# Patient Record
Sex: MALE | Race: BLACK OR AFRICAN AMERICAN | NOT HISPANIC OR LATINO | Employment: FULL TIME | URBAN - METROPOLITAN AREA
[De-identification: names, ages, dates, MRNs, and addresses within clinical notes are randomized per-mention and may not be internally consistent; named-entity substitution may affect disease eponyms.]

---

## 2019-12-27 ENCOUNTER — OCCUPATIONAL HEALTH (OUTPATIENT)
Dept: URGENT CARE | Facility: CLINIC | Age: 54
End: 2019-12-27

## 2019-12-27 DIAGNOSIS — Z02.1 PRE-EMPLOYMENT EXAMINATION: Primary | ICD-10-CM

## 2019-12-27 LAB — BREATH ALCOHOL: 0

## 2019-12-27 PROCEDURE — 82075 ASSAY OF BREATH ETHANOL: CPT | Mod: S$GLB,,, | Performed by: NURSE PRACTITIONER

## 2019-12-27 PROCEDURE — 82075 POCT BREATH ALCOHOL TEST: ICD-10-PCS | Mod: S$GLB,,, | Performed by: NURSE PRACTITIONER

## 2022-07-13 ENCOUNTER — HOSPITAL ENCOUNTER (OUTPATIENT)
Facility: HOSPITAL | Age: 57
Discharge: HOME | End: 2022-07-14
Attending: EMERGENCY MEDICINE | Admitting: FAMILY MEDICINE
Payer: MEDICAID

## 2022-07-13 DIAGNOSIS — R06.02 SOB (SHORTNESS OF BREATH): ICD-10-CM

## 2022-07-13 DIAGNOSIS — M94.0 COSTOCHONDRITIS: Primary | ICD-10-CM

## 2022-07-13 DIAGNOSIS — R07.9 CHEST PAIN: ICD-10-CM

## 2022-07-13 LAB
ALBUMIN SERPL BCP-MCNC: 3.9 G/DL (ref 3.5–5.2)
ALP SERPL-CCNC: 65 U/L (ref 55–135)
ALT SERPL W/O P-5'-P-CCNC: 12 U/L (ref 10–44)
AMPHET+METHAMPHET UR QL: NEGATIVE
ANION GAP SERPL CALC-SCNC: 14 MMOL/L (ref 8–16)
AST SERPL-CCNC: 19 U/L (ref 10–40)
BARBITURATES UR QL SCN>200 NG/ML: NEGATIVE
BASOPHILS # BLD AUTO: 0.06 K/UL (ref 0–0.2)
BASOPHILS NFR BLD: 0.4 % (ref 0–1.9)
BENZODIAZ UR QL SCN>200 NG/ML: NEGATIVE
BILIRUB SERPL-MCNC: 0.4 MG/DL (ref 0.1–1)
BNP SERPL-MCNC: 18 PG/ML (ref 0–99)
BUN SERPL-MCNC: 5 MG/DL (ref 6–20)
BZE UR QL SCN: ABNORMAL
CALCIUM SERPL-MCNC: 9.6 MG/DL (ref 8.7–10.5)
CANNABINOIDS UR QL SCN: NEGATIVE
CHLORIDE SERPL-SCNC: 100 MMOL/L (ref 95–110)
CO2 SERPL-SCNC: 22 MMOL/L (ref 23–29)
CREAT SERPL-MCNC: 0.7 MG/DL (ref 0.5–1.4)
CREAT UR-MCNC: 44 MG/DL (ref 23–375)
CTP QC/QA: YES
D DIMER PPP IA.FEU-MCNC: 0.45 MG/L FEU
D DIMER PPP IA.FEU-MCNC: 0.46 MG/L FEU
DIFFERENTIAL METHOD: ABNORMAL
EOSINOPHIL # BLD AUTO: 0 K/UL (ref 0–0.5)
EOSINOPHIL NFR BLD: 0 % (ref 0–8)
ERYTHROCYTE [DISTWIDTH] IN BLOOD BY AUTOMATED COUNT: 12.5 % (ref 11.5–14.5)
EST. GFR  (AFRICAN AMERICAN): >60 ML/MIN/1.73 M^2
EST. GFR  (NON AFRICAN AMERICAN): >60 ML/MIN/1.73 M^2
ESTIMATED AVG GLUCOSE: 120 MG/DL (ref 68–131)
GLUCOSE SERPL-MCNC: 112 MG/DL (ref 70–110)
HBA1C MFR BLD: 5.8 % (ref 4–5.6)
HCT VFR BLD AUTO: 40.6 % (ref 40–54)
HGB BLD-MCNC: 14.4 G/DL (ref 14–18)
IMM GRANULOCYTES # BLD AUTO: 0.05 K/UL (ref 0–0.04)
IMM GRANULOCYTES NFR BLD AUTO: 0.3 % (ref 0–0.5)
LACTATE SERPL-SCNC: 1.8 MMOL/L (ref 0.5–2.2)
LIPASE SERPL-CCNC: 9 U/L (ref 4–60)
LYMPHOCYTES # BLD AUTO: 1.3 K/UL (ref 1–4.8)
LYMPHOCYTES NFR BLD: 8.4 % (ref 18–48)
MCH RBC QN AUTO: 32.6 PG (ref 27–31)
MCHC RBC AUTO-ENTMCNC: 35.5 G/DL (ref 32–36)
MCV RBC AUTO: 92 FL (ref 82–98)
METHADONE UR QL SCN>300 NG/ML: NEGATIVE
MONOCYTES # BLD AUTO: 1.3 K/UL (ref 0.3–1)
MONOCYTES NFR BLD: 9 % (ref 4–15)
NEUTROPHILS # BLD AUTO: 12.1 K/UL (ref 1.8–7.7)
NEUTROPHILS NFR BLD: 81.9 % (ref 38–73)
NRBC BLD-RTO: 0 /100 WBC
OPIATES UR QL SCN: ABNORMAL
PCP UR QL SCN>25 NG/ML: NEGATIVE
PLATELET # BLD AUTO: 290 K/UL (ref 150–450)
PMV BLD AUTO: 8.9 FL (ref 9.2–12.9)
POTASSIUM SERPL-SCNC: 3.8 MMOL/L (ref 3.5–5.1)
PROCALCITONIN SERPL IA-MCNC: 0.08 NG/ML
PROT SERPL-MCNC: 8.5 G/DL (ref 6–8.4)
RBC # BLD AUTO: 4.42 M/UL (ref 4.6–6.2)
SARS-COV-2 RDRP RESP QL NAA+PROBE: NEGATIVE
SODIUM SERPL-SCNC: 136 MMOL/L (ref 136–145)
TOXICOLOGY INFORMATION: ABNORMAL
TROPONIN I SERPL DL<=0.01 NG/ML-MCNC: 0.01 NG/ML (ref 0–0.03)
TROPONIN I SERPL DL<=0.01 NG/ML-MCNC: 0.01 NG/ML (ref 0–0.03)
TROPONIN I SERPL DL<=0.01 NG/ML-MCNC: <0.006 NG/ML (ref 0–0.03)
TROPONIN I SERPL DL<=0.01 NG/ML-MCNC: <0.006 NG/ML (ref 0–0.03)
TSH SERPL DL<=0.005 MIU/L-ACNC: 0.7 UIU/ML (ref 0.4–4)
WBC # BLD AUTO: 14.83 K/UL (ref 3.9–12.7)

## 2022-07-13 PROCEDURE — 27000221 HC OXYGEN, UP TO 24 HOURS

## 2022-07-13 PROCEDURE — A4216 STERILE WATER/SALINE, 10 ML: HCPCS | Performed by: NURSE PRACTITIONER

## 2022-07-13 PROCEDURE — 93005 ELECTROCARDIOGRAM TRACING: CPT

## 2022-07-13 PROCEDURE — 96375 TX/PRO/DX INJ NEW DRUG ADDON: CPT

## 2022-07-13 PROCEDURE — 25000003 PHARM REV CODE 250: Performed by: NURSE PRACTITIONER

## 2022-07-13 PROCEDURE — 94761 N-INVAS EAR/PLS OXIMETRY MLT: CPT

## 2022-07-13 PROCEDURE — 25000242 PHARM REV CODE 250 ALT 637 W/ HCPCS: Performed by: NURSE PRACTITIONER

## 2022-07-13 PROCEDURE — 80053 COMPREHEN METABOLIC PANEL: CPT | Performed by: NURSE PRACTITIONER

## 2022-07-13 PROCEDURE — 84443 ASSAY THYROID STIM HORMONE: CPT | Performed by: NURSE PRACTITIONER

## 2022-07-13 PROCEDURE — 99900035 HC TECH TIME PER 15 MIN (STAT)

## 2022-07-13 PROCEDURE — 93010 EKG 12-LEAD: ICD-10-PCS | Mod: ,,, | Performed by: INTERNAL MEDICINE

## 2022-07-13 PROCEDURE — 93010 ELECTROCARDIOGRAM REPORT: CPT | Mod: ,,, | Performed by: INTERNAL MEDICINE

## 2022-07-13 PROCEDURE — 83690 ASSAY OF LIPASE: CPT | Performed by: NURSE PRACTITIONER

## 2022-07-13 PROCEDURE — 63600175 PHARM REV CODE 636 W HCPCS: Performed by: NURSE PRACTITIONER

## 2022-07-13 PROCEDURE — 87040 BLOOD CULTURE FOR BACTERIA: CPT | Mod: 59 | Performed by: NURSE PRACTITIONER

## 2022-07-13 PROCEDURE — 93010 ELECTROCARDIOGRAM REPORT: CPT | Mod: 59,76,, | Performed by: INTERNAL MEDICINE

## 2022-07-13 PROCEDURE — U0002 COVID-19 LAB TEST NON-CDC: HCPCS | Performed by: NURSE PRACTITIONER

## 2022-07-13 PROCEDURE — 83880 ASSAY OF NATRIURETIC PEPTIDE: CPT | Performed by: NURSE PRACTITIONER

## 2022-07-13 PROCEDURE — 84484 ASSAY OF TROPONIN QUANT: CPT | Mod: 91 | Performed by: NURSE PRACTITIONER

## 2022-07-13 PROCEDURE — 96376 TX/PRO/DX INJ SAME DRUG ADON: CPT

## 2022-07-13 PROCEDURE — 99285 EMERGENCY DEPT VISIT HI MDM: CPT | Mod: 25

## 2022-07-13 PROCEDURE — G0378 HOSPITAL OBSERVATION PER HR: HCPCS

## 2022-07-13 PROCEDURE — 84145 PROCALCITONIN (PCT): CPT | Performed by: NURSE PRACTITIONER

## 2022-07-13 PROCEDURE — 99203 PR OFFICE/OUTPT VISIT, NEW, LEVL III, 30-44 MIN: ICD-10-PCS | Mod: ,,, | Performed by: INTERNAL MEDICINE

## 2022-07-13 PROCEDURE — 83036 HEMOGLOBIN GLYCOSYLATED A1C: CPT | Performed by: NURSE PRACTITIONER

## 2022-07-13 PROCEDURE — 99203 OFFICE O/P NEW LOW 30 MIN: CPT | Mod: ,,, | Performed by: INTERNAL MEDICINE

## 2022-07-13 PROCEDURE — 83605 ASSAY OF LACTIC ACID: CPT | Performed by: NURSE PRACTITIONER

## 2022-07-13 PROCEDURE — 96374 THER/PROPH/DIAG INJ IV PUSH: CPT

## 2022-07-13 PROCEDURE — 85379 FIBRIN DEGRADATION QUANT: CPT | Mod: 91 | Performed by: NURSE PRACTITIONER

## 2022-07-13 PROCEDURE — 85379 FIBRIN DEGRADATION QUANT: CPT | Performed by: NURSE PRACTITIONER

## 2022-07-13 PROCEDURE — 85025 COMPLETE CBC W/AUTO DIFF WBC: CPT | Performed by: NURSE PRACTITIONER

## 2022-07-13 PROCEDURE — 80307 DRUG TEST PRSMV CHEM ANLYZR: CPT | Performed by: FAMILY MEDICINE

## 2022-07-13 RX ORDER — AMOXICILLIN 250 MG
1 CAPSULE ORAL 2 TIMES DAILY
Status: DISCONTINUED | OUTPATIENT
Start: 2022-07-13 | End: 2022-07-14 | Stop reason: HOSPADM

## 2022-07-13 RX ORDER — ONDANSETRON 2 MG/ML
4 INJECTION INTRAMUSCULAR; INTRAVENOUS
Status: COMPLETED | OUTPATIENT
Start: 2022-07-13 | End: 2022-07-13

## 2022-07-13 RX ORDER — HYDRALAZINE HYDROCHLORIDE 20 MG/ML
10 INJECTION INTRAMUSCULAR; INTRAVENOUS EVERY 8 HOURS PRN
Status: DISCONTINUED | OUTPATIENT
Start: 2022-07-13 | End: 2022-07-14 | Stop reason: HOSPADM

## 2022-07-13 RX ORDER — SIMETHICONE 80 MG
1 TABLET,CHEWABLE ORAL 4 TIMES DAILY PRN
Status: DISCONTINUED | OUTPATIENT
Start: 2022-07-13 | End: 2022-07-14 | Stop reason: HOSPADM

## 2022-07-13 RX ORDER — KETOROLAC TROMETHAMINE 30 MG/ML
30 INJECTION, SOLUTION INTRAMUSCULAR; INTRAVENOUS ONCE
Status: COMPLETED | OUTPATIENT
Start: 2022-07-13 | End: 2022-07-13

## 2022-07-13 RX ORDER — ACETAMINOPHEN 325 MG/1
650 TABLET ORAL EVERY 4 HOURS PRN
Status: DISCONTINUED | OUTPATIENT
Start: 2022-07-13 | End: 2022-07-14 | Stop reason: HOSPADM

## 2022-07-13 RX ORDER — ONDANSETRON 8 MG/1
8 TABLET, ORALLY DISINTEGRATING ORAL EVERY 8 HOURS PRN
Status: DISCONTINUED | OUTPATIENT
Start: 2022-07-13 | End: 2022-07-14 | Stop reason: HOSPADM

## 2022-07-13 RX ORDER — NITROGLYCERIN 0.4 MG/1
0.4 TABLET SUBLINGUAL EVERY 5 MIN PRN
Status: COMPLETED | OUTPATIENT
Start: 2022-07-13 | End: 2022-07-13

## 2022-07-13 RX ORDER — TALC
6 POWDER (GRAM) TOPICAL NIGHTLY PRN
Status: DISCONTINUED | OUTPATIENT
Start: 2022-07-13 | End: 2022-07-14 | Stop reason: HOSPADM

## 2022-07-13 RX ORDER — MORPHINE SULFATE 4 MG/ML
4 INJECTION, SOLUTION INTRAMUSCULAR; INTRAVENOUS
Status: COMPLETED | OUTPATIENT
Start: 2022-07-13 | End: 2022-07-13

## 2022-07-13 RX ORDER — LIDOCAINE HYDROCHLORIDE 20 MG/ML
10 SOLUTION OROPHARYNGEAL ONCE
Status: COMPLETED | OUTPATIENT
Start: 2022-07-13 | End: 2022-07-13

## 2022-07-13 RX ORDER — IPRATROPIUM BROMIDE AND ALBUTEROL SULFATE 2.5; .5 MG/3ML; MG/3ML
3 SOLUTION RESPIRATORY (INHALATION) EVERY 4 HOURS PRN
Status: DISCONTINUED | OUTPATIENT
Start: 2022-07-13 | End: 2022-07-14 | Stop reason: HOSPADM

## 2022-07-13 RX ORDER — MAG HYDROX/ALUMINUM HYD/SIMETH 200-200-20
30 SUSPENSION, ORAL (FINAL DOSE FORM) ORAL ONCE
Status: COMPLETED | OUTPATIENT
Start: 2022-07-13 | End: 2022-07-13

## 2022-07-13 RX ORDER — MAG HYDROX/ALUMINUM HYD/SIMETH 200-200-20
30 SUSPENSION, ORAL (FINAL DOSE FORM) ORAL 4 TIMES DAILY PRN
Status: DISCONTINUED | OUTPATIENT
Start: 2022-07-13 | End: 2022-07-14 | Stop reason: HOSPADM

## 2022-07-13 RX ORDER — NITROGLYCERIN 0.4 MG/1
0.4 TABLET SUBLINGUAL EVERY 5 MIN PRN
Status: DISCONTINUED | OUTPATIENT
Start: 2022-07-13 | End: 2022-07-14 | Stop reason: HOSPADM

## 2022-07-13 RX ORDER — SODIUM CHLORIDE 0.9 % (FLUSH) 0.9 %
10 SYRINGE (ML) INJECTION EVERY 8 HOURS
Status: DISCONTINUED | OUTPATIENT
Start: 2022-07-13 | End: 2022-07-14 | Stop reason: HOSPADM

## 2022-07-13 RX ORDER — MORPHINE SULFATE 4 MG/ML
3 INJECTION, SOLUTION INTRAMUSCULAR; INTRAVENOUS ONCE
Status: COMPLETED | OUTPATIENT
Start: 2022-07-13 | End: 2022-07-13

## 2022-07-13 RX ORDER — MORPHINE SULFATE 2 MG/ML
2 INJECTION, SOLUTION INTRAMUSCULAR; INTRAVENOUS EVERY 4 HOURS PRN
Status: DISCONTINUED | OUTPATIENT
Start: 2022-07-13 | End: 2022-07-14 | Stop reason: HOSPADM

## 2022-07-13 RX ORDER — ONDANSETRON 2 MG/ML
4 INJECTION INTRAMUSCULAR; INTRAVENOUS EVERY 8 HOURS PRN
Status: DISCONTINUED | OUTPATIENT
Start: 2022-07-13 | End: 2022-07-14 | Stop reason: HOSPADM

## 2022-07-13 RX ADMIN — Medication 6 MG: at 10:07

## 2022-07-13 RX ADMIN — NITROGLYCERIN 0.4 MG: 0.4 TABLET, ORALLY DISINTEGRATING SUBLINGUAL at 11:07

## 2022-07-13 RX ADMIN — Medication 10 ML: at 10:07

## 2022-07-13 RX ADMIN — KETOROLAC TROMETHAMINE 30 MG: 30 INJECTION, SOLUTION INTRAMUSCULAR at 10:07

## 2022-07-13 RX ADMIN — NITROGLYCERIN 0.4 MG: 0.4 TABLET, ORALLY DISINTEGRATING SUBLINGUAL at 10:07

## 2022-07-13 RX ADMIN — ALUMINUM HYDROXIDE, MAGNESIUM HYDROXIDE, AND SIMETHICONE 30 ML: 200; 200; 20 SUSPENSION ORAL at 01:07

## 2022-07-13 RX ADMIN — DOCUSATE SODIUM AND SENNOSIDES 1 TABLET: 8.6; 5 TABLET, FILM COATED ORAL at 10:07

## 2022-07-13 RX ADMIN — LIDOCAINE HYDROCHLORIDE 10 ML: 20 SOLUTION ORAL; TOPICAL at 01:07

## 2022-07-13 RX ADMIN — MORPHINE SULFATE 2 MG: 2 INJECTION, SOLUTION INTRAMUSCULAR; INTRAVENOUS at 08:07

## 2022-07-13 RX ADMIN — MORPHINE SULFATE 4 MG: 4 INJECTION INTRAVENOUS at 11:07

## 2022-07-13 RX ADMIN — ONDANSETRON HYDROCHLORIDE 4 MG: 2 INJECTION, SOLUTION INTRAMUSCULAR; INTRAVENOUS at 11:07

## 2022-07-13 RX ADMIN — MORPHINE SULFATE 3 MG: 4 INJECTION INTRAVENOUS at 02:07

## 2022-07-13 NOTE — H&P
[facility] Emergency [facility] Medicine  History & Physical    Patient Name: Dell Sewell III  MRN: [id number]  Patient Class: OP- Observation  Admission Date: 7/13/2022  Attending Physician: Jackeline Atkinson*   Primary Care Provider: Aman Griggs MD (Inactive)         Patient information was obtained from patient, past medical records and ER records.     Subjective:     Principal Problem:Chest pain    Chief Complaint:   Chief Complaint   Patient presents with    Chest Pain     Chest pain since 0400. Pain is left sided and radiates to neck.  + tachypnea. Pt is tearful        HPI: Mr. Dell Sewell is a 57 y/o with a pmh of HTN, HLD, and cocaine abuse. He has no PCP. Has recently used cocaine within the past 2 weeks per the patient. presenting with complaints of CP since 0400. He states the pain is left sided and radiates to his neck.  He is + for tachypnea and he feels anxious. Pt is tearful. He denies SOB, palpitations, fever, chills, lower ext pain, nausea, vomiting, or diarrhea. He denies trauma.     His ED workup includes normal troponin, WBC--14.83, stable D-dimer, troponin stable, UDS + for cocaine and opiates. He is COVID negative. CXR--No convincing evidence of acute cardiopulmonary disease. Urinalysis is pending, blood cultures are pending. Will follow procal and lactate. We will admit him to the [facility] service for further care.      Past Medical History:   Diagnosis Date    High cholesterol     Hypertension        Past Surgical History:   Procedure Laterality Date    CHOLECYSTECTOMY         Review of patient's allergies indicates:  No Known Allergies    No current facility-administered medications on file prior to encounter.     Current Outpatient Medications on File Prior to Encounter   Medication Sig    diazepam (VALIUM) 5 MG tablet 10mg PO Q6 hours x 3, 5mg PO Q6 hrs x 3, 2.5mg PO Q6 hrs x 3 (Patient not taking: Reported on 7/13/2022)    nifedipine (PROCARDIA) 10 MG Cap Take 1 capsule  (10 mg total) by mouth every 8 (eight) hours. (Patient not taking: Reported on 7/13/2022)    [DISCONTINUED] ondansetron (ZOFRAN) 4 MG tablet Take 1 tablet (4 mg total) by mouth every 8 (eight) hours as needed.    [DISCONTINUED] promethazine (PHENERGAN) 25 MG tablet Take 1 tablet (25 mg total) by mouth every 6 (six) hours as needed for Nausea.     Family History    None       Tobacco Use    Smoking status: Current Every Day Smoker    Smokeless tobacco: Not on file   Substance and Sexual Activity    Alcohol use: Yes     Comment: 8 quarts beer daily    Drug use: Yes     Types: Cocaine    Sexual activity: Not on file     Review of Systems   Constitutional:  Positive for activity change and appetite change. Negative for fatigue and fever.   HENT:  Negative for congestion and trouble swallowing.    Cardiovascular:  Positive for chest pain. Negative for palpitations and leg swelling.   Gastrointestinal:  Negative for abdominal distention, blood in stool, diarrhea, nausea and vomiting.   Genitourinary:  Negative for difficulty urinating and hematuria.   Musculoskeletal:  Positive for myalgias. Negative for back pain.   Neurological:  Negative for dizziness, weakness, numbness and headaches.   Hematological:  Does not bruise/bleed easily.   Psychiatric/Behavioral:  Negative for confusion and hallucinations. The patient is not nervous/anxious.    Objective:     Vital Signs (Most Recent):  Temp: 98.2 °F (36.8 °C) (07/13/22 1039)  Pulse: 76 (07/13/22 1501)  Resp: (!) 23 (07/13/22 1500)  BP: (!) 141/74 (07/13/22 1501)  SpO2: 95 % (07/13/22 1501)   Vital Signs (24h Range):  Temp:  [98.2 °F (36.8 °C)] 98.2 °F (36.8 °C)  Pulse:  [] 76  Resp:  [20-36] 23  SpO2:  [94 %-99 %] 95 %  BP: (118-154)/(66-82) 141/74     Weight: 81.6 kg (180 lb)  Body mass index is 26.58 kg/m².    Physical Exam  Vitals and nursing note reviewed.   Constitutional:       Appearance: Normal appearance.   HENT:      Head: Normocephalic and  atraumatic.      Mouth/Throat:      Mouth: Mucous membranes are moist.   Eyes:      Extraocular Movements: Extraocular movements intact.      Conjunctiva/sclera: Conjunctivae normal.   Cardiovascular:      Rate and Rhythm: Normal rate.      Pulses: Normal pulses.      Heart sounds: Normal heart sounds.   Pulmonary:      Effort: Pulmonary effort is normal. No respiratory distress.      Breath sounds: Normal breath sounds. No wheezing.   Abdominal:      General: Bowel sounds are normal. There is no distension.      Palpations: Abdomen is soft.      Tenderness: There is no abdominal tenderness. There is no guarding.   Musculoskeletal:         General: Normal range of motion.      Cervical back: Normal range of motion and neck supple.   Skin:     General: Skin is warm and dry.      Capillary Refill: Capillary refill takes 2 to 3 seconds.   Neurological:      Mental Status: He is alert and oriented to person, place, and time. Mental status is at baseline.   Psychiatric:         Mood and Affect: Mood normal.         Behavior: Behavior normal.           Significant Labs: All pertinent labs within the past 24 hours have been reviewed.    Significant Imaging: I have reviewed all pertinent imaging results/findings within the past 24 hours.    Assessment/Plan:     * Chest pain  Trending troponin  D-dimer negative  Echo pending  Consulting [facility] cardiology  Cont supplemental O2  Prn Morphine ordered        Essential hypertension  Monitor blood pressure  Will order Hydralazine 10 mg IV q 8 hrs prn      Cocaine abuse  Will need counseling on discharge         VTE Risk Mitigation (From admission, onward)         Ordered     IP VTE LOW RISK PATIENT  Once         07/13/22 1521     Place sequential compression device  Until discontinued         07/13/22 1521                   Luis Manuel Reyes NP  Department of Hospital Medicine   [place] - Emergency Dept

## 2022-07-13 NOTE — PHARMACY MED REC
"[facility]           Pharmacy  Admission Medication History     The home medication history was taken by Sue Dunn.      Medication history obtained from Medications listed below were obtained from: Patient/family    Based on information gathered for medication list, you may go to "Admission" then "Reconcile Home Medications" tabs to review and/or act upon those items.      The home medication list has been updated by the Pharmacy department.    Please read ALL comments highlighted in yellow.    Please address this information as you see fit.     Feel free to contact us if you have any questions or require assistance.    The medications listed below were removed from the home medication list.  Please reorder if appropriate:     Patient reports NOT TAKING the following medication(s):  o zofran 4mg  o Phenergan 25mg    Potential issues to be addressed PRIOR TO DISCHARGE  Patient reported not taking the following medications: (procardia 10mg). These medications remain on the home medication list. Please address accordingly.     No current facility-administered medications on file prior to encounter.     Current Outpatient Medications on File Prior to Encounter   Medication Sig Dispense Refill    diazepam (VALIUM) 5 MG tablet 10mg PO Q6 hours x 3, 5mg PO Q6 hrs x 3, 2.5mg PO Q6 hrs x 3 (Patient not taking: Reported on 7/13/2022) 11 tablet 0    nifedipine (PROCARDIA) 10 MG Cap Take 1 capsule (10 mg total) by mouth every 8 (eight) hours. (Patient not taking: Reported on 7/13/2022) 90 capsule 1    [DISCONTINUED] ondansetron (ZOFRAN) 4 MG tablet Take 1 tablet (4 mg total) by mouth every 8 (eight) hours as needed. 20 tablet 0    [DISCONTINUED] promethazine (PHENERGAN) 25 MG tablet Take 1 tablet (25 mg total) by mouth every 6 (six) hours as needed for Nausea. 15 tablet 0       Please address this information as you see fit.  Feel free to contact us if you have any questions or require " assistance.    Sue Dunn  [phone]                  .

## 2022-07-13 NOTE — ED PROVIDER NOTES
Encounter Date: 7/13/2022    SCRIBE #1 NOTE: I, Natalya Sanchez , am scribing for, and in the presence of, Joya Vargas NP.       History     Chief Complaint   Patient presents with    Chest Pain     Chest pain since 0400. Pain is left sided and radiates to neck.  + tachypnea. Pt is tearful     Dell Sewell III is a 56 y.o. male who  has a past medical history of High cholesterol and Hypertension.    The patient presents to the ED due to Chest Pain.   Patient reports localization of left anterior chest pain that has been constant which started at 0400 with associated SOB when pain flares. Patient describes the chest pain as sharp. Patient denies stress/ anxiety factors and being on blood thinners. Patient also reports no recent travels, MI's, blood clots, and recent sickness. Patient also denies rash, fever, and neck stiffness.    The history is provided by the patient.     Review of patient's allergies indicates:  No Known Allergies  Past Medical History:   Diagnosis Date    High cholesterol     Hypertension      Past Surgical History:   Procedure Laterality Date    CHOLECYSTECTOMY       No family history on file.  Social History     Tobacco Use    Smoking status: Current Every Day Smoker   Substance Use Topics    Alcohol use: Yes     Comment: 8 quarts beer daily    Drug use: Yes     Types: Cocaine     Review of Systems   Constitutional: Negative for fever.   HENT: Negative for sore throat.    Respiratory: Positive for shortness of breath.    Cardiovascular: Positive for chest pain.   Gastrointestinal: Negative for nausea.   Genitourinary: Negative for dysuria.   Musculoskeletal: Negative for back pain and neck stiffness.   Skin: Negative for rash.   Neurological: Negative for weakness.   Hematological: Does not bruise/bleed easily.       Physical Exam     Initial Vitals [07/13/22 1039]   BP Pulse Resp Temp SpO2   124/74 94 (!) 24 98.2 °F (36.8 °C) 99 %      MAP       --         Physical  Exam    Constitutional: He appears well-developed and well-nourished. He appears distressed.   HENT:   Head: Normocephalic and atraumatic.   Right Ear: Hearing and tympanic membrane normal.   Left Ear: Hearing and tympanic membrane normal.   Nose: Nose normal.   Mouth/Throat: Uvula is midline, oropharynx is clear and moist and mucous membranes are normal.   Eyes: Lids are normal. Pupils are equal, round, and reactive to light.   Neck:   Normal range of motion.  Cardiovascular: Normal rate.   Pulmonary/Chest: Effort normal and breath sounds normal. No respiratory distress. He has no wheezes. He has no rhonchi.   Non reproducible chest pain   Abdominal: Abdomen is soft. There is no abdominal tenderness.   Musculoskeletal:         General: Normal range of motion.      Cervical back: Normal range of motion. No edema, erythema or rigidity. Normal range of motion.      Comments: No peripheral edema     Neurological: He is oriented to person, place, and time.   Skin: Skin is warm and dry. No rash noted.   Psychiatric: He has a normal mood and affect. His behavior is normal. Judgment and thought content normal.         ED Course   Procedures  Labs Reviewed   CBC W/ AUTO DIFFERENTIAL - Abnormal; Notable for the following components:       Result Value    WBC 14.83 (*)     RBC 4.42 (*)     MCH 32.6 (*)     MPV 8.9 (*)     Gran # (ANC) 12.1 (*)     Immature Grans (Abs) 0.05 (*)     Mono # 1.3 (*)     Gran % 81.9 (*)     Lymph % 8.4 (*)     All other components within normal limits   COMPREHENSIVE METABOLIC PANEL - Abnormal; Notable for the following components:    CO2 22 (*)     Glucose 112 (*)     BUN 5 (*)     Total Protein 8.5 (*)     All other components within normal limits   TROPONIN I   B-TYPE NATRIURETIC PEPTIDE   D DIMER, QUANTITATIVE   LIPASE   LIPASE   TROPONIN I   TOXICOLOGY SCREEN, URINE, RANDOM (COMPLIANCE)   TROPONIN I   D DIMER, QUANTITATIVE   SARS-COV-2 RDRP GENE          Imaging Results          X-Ray Chest  AP Portable (Final result)  Result time 07/13/22 11:48:57    Final result by Niles Reynolds MD (07/13/22 11:48:57)                 Impression:      No convincing evidence of acute cardiopulmonary disease.      Electronically signed by: Niles Reynolds  Date:    07/13/2022  Time:    11:48             Narrative:    EXAMINATION:  XR CHEST AP PORTABLE    CLINICAL HISTORY:  Chest Pain;    TECHNIQUE:  Single frontal view of the chest was performed.    COMPARISON:  Chest radiograph performed 06/08/2015.    FINDINGS:  Monitoring leads overlie the chest.  Grossly unchanged cardiomediastinal contours background interstitial coarsening, similar when compared to remote radiograph of 06/08/2015.    No definite pneumothorax or large volume pleural effusion.  No acute findings identified in the visualized abdomen.  Osseous and soft tissue structures appear without definite acute abnormality.                                 Medications   morphine injection 3 mg (has no administration in time range)   nitroGLYCERIN SL tablet 0.4 mg (0.4 mg Sublingual Given 7/13/22 1106)   morphine injection 4 mg (4 mg Intravenous Given 7/13/22 1131)   ondansetron injection 4 mg (4 mg Intravenous Given 7/13/22 1131)   aluminum-magnesium hydroxide-simethicone 200-200-20 mg/5 mL suspension 30 mL (30 mLs Oral Given 7/13/22 1322)     And   LIDOcaine HCl 2% oral solution 10 mL (10 mLs Oral Given 7/13/22 1322)     Medical Decision Making:   Differential Diagnosis:       Clinical Tests:   Lab Tests: Ordered and Reviewed  Radiological Study: Ordered and Reviewed  Medical Tests: Ordered and Reviewed          Scribe Attestation:   Scribe #1: I performed the above scribed service and the documentation accurately describes the services I performed. I attest to the accuracy of the note.        ED Course as of 07/13/22 1402   Wed Jul 13, 2022   1121 Pt had minimal improvement in his chest pain after the nitro was given.  Morphine ordered at this time.  [DT]    1137 EKG interpretation by ED attending physician:  Normal sinus rhythm, rate 93, no ST changes or evidence of ischemia, normal intervals.  Stable from May 2016.  [SS]   1202 Pts first trop is WNL. After further review with Dr Ross, we recommend admission for further cardiac evaluation based on the pts clinical presentation.  [DT]   1305 Spoke with Dr Dodson concerning admission.  [DT]      ED Course User Index  [DT] Joya Vargas NP  [SS] Dominick Ross MD             Clinical Impression:   Final diagnoses:  [R07.9] Chest pain  [R06.02] SOB (shortness of breath) (Primary)          ED Disposition Condition    Observation        I, Joya Vargas NP, personally performed the services described in this documentation.All medical record entries made by the scribe were at my direction and in my presence.I have reviewed the chart and agree that the record reflects my personal performance and is accurate and complete.           Joya Vargas NP  07/13/22 8117

## 2022-07-13 NOTE — ASSESSMENT & PLAN NOTE
Trending troponin  D-dimer negative  Echo pending  Consulting [facility] cardiology  Cont supplemental O2  Prn Morphine ordered

## 2022-07-13 NOTE — HPI
Mr. Dell Sewell is a 57 y/o with a pmh of HTN, HLD, and cocaine abuse. He has no PCP. Has recently used cocaine within the past 2 weeks per the patient. presenting with complaints of CP since 0400. He states the pain is left sided and radiates to his neck.  He is + for tachypnea and he feels anxious. Pt is tearful. He denies SOB, palpitations, fever, chills, lower ext pain, nausea, vomiting, or diarrhea. He denies trauma.     His ED workup includes normal troponin, WBC--14.83, stable D-dimer, troponin stable, UDS + for cocaine and opiates. He is COVID negative. CXR--No convincing evidence of acute cardiopulmonary disease. Urinalysis is pending, blood cultures are pending. Will follow procal and lactate. We will admit him to the [facility] service for further care.

## 2022-07-13 NOTE — SUBJECTIVE & OBJECTIVE
Past Medical History:   Diagnosis Date    High cholesterol     Hypertension        Past Surgical History:   Procedure Laterality Date    CHOLECYSTECTOMY         Review of patient's allergies indicates:  No Known Allergies    No current facility-administered medications on file prior to encounter.     Current Outpatient Medications on File Prior to Encounter   Medication Sig    diazepam (VALIUM) 5 MG tablet 10mg PO Q6 hours x 3, 5mg PO Q6 hrs x 3, 2.5mg PO Q6 hrs x 3 (Patient not taking: Reported on 7/13/2022)    nifedipine (PROCARDIA) 10 MG Cap Take 1 capsule (10 mg total) by mouth every 8 (eight) hours. (Patient not taking: Reported on 7/13/2022)    [DISCONTINUED] ondansetron (ZOFRAN) 4 MG tablet Take 1 tablet (4 mg total) by mouth every 8 (eight) hours as needed.    [DISCONTINUED] promethazine (PHENERGAN) 25 MG tablet Take 1 tablet (25 mg total) by mouth every 6 (six) hours as needed for Nausea.     Family History    None       Tobacco Use    Smoking status: Current Every Day Smoker    Smokeless tobacco: Not on file   Substance and Sexual Activity    Alcohol use: Yes     Comment: 8 quarts beer daily    Drug use: Yes     Types: Cocaine    Sexual activity: Not on file     Review of Systems   Constitutional:  Positive for activity change and appetite change. Negative for fatigue and fever.   HENT:  Negative for congestion and trouble swallowing.    Cardiovascular:  Positive for chest pain. Negative for palpitations and leg swelling.   Gastrointestinal:  Negative for abdominal distention, blood in stool, diarrhea, nausea and vomiting.   Genitourinary:  Negative for difficulty urinating and hematuria.   Musculoskeletal:  Positive for myalgias. Negative for back pain.   Neurological:  Negative for dizziness, weakness, numbness and headaches.   Hematological:  Does not bruise/bleed easily.   Psychiatric/Behavioral:  Negative for confusion and hallucinations. The patient is not nervous/anxious.    Objective:     Vital  Signs (Most Recent):  Temp: 98.2 °F (36.8 °C) (07/13/22 1039)  Pulse: 76 (07/13/22 1501)  Resp: (!) 23 (07/13/22 1500)  BP: (!) 141/74 (07/13/22 1501)  SpO2: 95 % (07/13/22 1501)   Vital Signs (24h Range):  Temp:  [98.2 °F (36.8 °C)] 98.2 °F (36.8 °C)  Pulse:  [] 76  Resp:  [20-36] 23  SpO2:  [94 %-99 %] 95 %  BP: (118-154)/(66-82) 141/74     Weight: 81.6 kg (180 lb)  Body mass index is 26.58 kg/m².    Physical Exam  Vitals and nursing note reviewed.   Constitutional:       Appearance: Normal appearance.   HENT:      Head: Normocephalic and atraumatic.      Mouth/Throat:      Mouth: Mucous membranes are moist.   Eyes:      Extraocular Movements: Extraocular movements intact.      Conjunctiva/sclera: Conjunctivae normal.   Cardiovascular:      Rate and Rhythm: Normal rate.      Pulses: Normal pulses.      Heart sounds: Normal heart sounds.   Pulmonary:      Effort: Pulmonary effort is normal. No respiratory distress.      Breath sounds: Normal breath sounds. No wheezing.   Abdominal:      General: Bowel sounds are normal. There is no distension.      Palpations: Abdomen is soft.      Tenderness: There is no abdominal tenderness. There is no guarding.   Musculoskeletal:         General: Normal range of motion.      Cervical back: Normal range of motion and neck supple.   Skin:     General: Skin is warm and dry.      Capillary Refill: Capillary refill takes 2 to 3 seconds.   Neurological:      Mental Status: He is alert and oriented to person, place, and time. Mental status is at baseline.   Psychiatric:         Mood and Affect: Mood normal.         Behavior: Behavior normal.           Significant Labs: All pertinent labs within the past 24 hours have been reviewed.    Significant Imaging: I have reviewed all pertinent imaging results/findings within the past 24 hours.
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
## 2022-07-14 ENCOUNTER — TELEPHONE (OUTPATIENT)
Dept: PRIMARY CARE CLINIC | Facility: CLINIC | Age: 57
End: 2022-07-14
Payer: MEDICAID

## 2022-07-14 ENCOUNTER — CLINICAL SUPPORT (OUTPATIENT)
Dept: SMOKING CESSATION | Facility: CLINIC | Age: 57
End: 2022-07-14

## 2022-07-14 VITALS
DIASTOLIC BLOOD PRESSURE: 64 MMHG | HEIGHT: 68 IN | BODY MASS INDEX: 25.01 KG/M2 | SYSTOLIC BLOOD PRESSURE: 117 MMHG | TEMPERATURE: 98 F | HEART RATE: 66 BPM | OXYGEN SATURATION: 96 % | WEIGHT: 165 LBS | RESPIRATION RATE: 18 BRPM

## 2022-07-14 DIAGNOSIS — F17.210 CIGARETTE SMOKER: Primary | ICD-10-CM

## 2022-07-14 PROBLEM — R07.9 CHEST PAIN: Status: RESOLVED | Noted: 2022-07-13 | Resolved: 2022-07-14

## 2022-07-14 PROBLEM — M94.0 COSTOCHONDRITIS: Status: ACTIVE | Noted: 2022-07-14

## 2022-07-14 LAB
ALBUMIN SERPL BCP-MCNC: 3.1 G/DL (ref 3.5–5.2)
ALP SERPL-CCNC: 63 U/L (ref 55–135)
ALT SERPL W/O P-5'-P-CCNC: 16 U/L (ref 10–44)
ANION GAP SERPL CALC-SCNC: 11 MMOL/L (ref 8–16)
AORTIC ROOT ANNULUS: 2.53 CM
ASCENDING AORTA: 3.48 CM
AST SERPL-CCNC: 21 U/L (ref 10–40)
AV INDEX (PROSTH): 0.7
AV MEAN GRADIENT: 5 MMHG
AV PEAK GRADIENT: 9 MMHG
AV VALVE AREA: 2.39 CM2
AV VELOCITY RATIO: 0.74
BASOPHILS # BLD AUTO: 0.04 K/UL (ref 0–0.2)
BASOPHILS NFR BLD: 0.5 % (ref 0–1.9)
BILIRUB SERPL-MCNC: 0.7 MG/DL (ref 0.1–1)
BSA FOR ECHO PROCEDURE: 1.89 M2
BUN SERPL-MCNC: 11 MG/DL (ref 6–20)
CALCIUM SERPL-MCNC: 9.2 MG/DL (ref 8.7–10.5)
CHLORIDE SERPL-SCNC: 100 MMOL/L (ref 95–110)
CHOLEST SERPL-MCNC: 126 MG/DL (ref 120–199)
CHOLEST/HDLC SERPL: 3 {RATIO} (ref 2–5)
CO2 SERPL-SCNC: 26 MMOL/L (ref 23–29)
CREAT SERPL-MCNC: 0.7 MG/DL (ref 0.5–1.4)
CV ECHO LV RWT: 0.21 CM
DIFFERENTIAL METHOD: ABNORMAL
DOP CALC AO PEAK VEL: 1.49 M/S
DOP CALC AO VTI: 29.71 CM
DOP CALC LVOT AREA: 3.4 CM2
DOP CALC LVOT DIAMETER: 2.09 CM
DOP CALC LVOT PEAK VEL: 1.11 M/S
DOP CALC LVOT STROKE VOLUME: 71.15 CM3
DOP CALC MV VTI: 29.31 CM
DOP CALCLVOT PEAK VEL VTI: 20.75 CM
E WAVE DECELERATION TIME: 178.47 MSEC
E/A RATIO: 1.08
E/E' RATIO: 8.1 M/S
ECHO LV POSTERIOR WALL: 0.59 CM (ref 0.6–1.1)
EJECTION FRACTION: 55 %
EOSINOPHIL # BLD AUTO: 0.1 K/UL (ref 0–0.5)
EOSINOPHIL NFR BLD: 0.6 % (ref 0–8)
ERYTHROCYTE [DISTWIDTH] IN BLOOD BY AUTOMATED COUNT: 12.5 % (ref 11.5–14.5)
EST. GFR  (AFRICAN AMERICAN): >60 ML/MIN/1.73 M^2
EST. GFR  (NON AFRICAN AMERICAN): >60 ML/MIN/1.73 M^2
FRACTIONAL SHORTENING: 57 % (ref 28–44)
GLUCOSE SERPL-MCNC: 99 MG/DL (ref 70–110)
HCT VFR BLD AUTO: 37.2 % (ref 40–54)
HDLC SERPL-MCNC: 42 MG/DL (ref 40–75)
HDLC SERPL: 33.3 % (ref 20–50)
HGB BLD-MCNC: 13 G/DL (ref 14–18)
IMM GRANULOCYTES # BLD AUTO: 0.02 K/UL (ref 0–0.04)
IMM GRANULOCYTES NFR BLD AUTO: 0.2 % (ref 0–0.5)
INTERVENTRICULAR SEPTUM: 0.84 CM (ref 0.6–1.1)
IVRT: 82.78 MSEC
LA MAJOR: 4.96 CM
LA MINOR: 4.7 CM
LA WIDTH: 4.3 CM
LDLC SERPL CALC-MCNC: 73.6 MG/DL (ref 63–159)
LEFT ATRIUM SIZE: 3.78 CM
LEFT ATRIUM VOLUME INDEX MOD: 19.6 ML/M2
LEFT ATRIUM VOLUME INDEX: 35.5 ML/M2
LEFT ATRIUM VOLUME MOD: 36.92 CM3
LEFT ATRIUM VOLUME: 66.68 CM3
LEFT INTERNAL DIMENSION IN SYSTOLE: 2.36 CM (ref 2.1–4)
LEFT VENTRICLE DIASTOLIC VOLUME INDEX: 78.47 ML/M2
LEFT VENTRICLE DIASTOLIC VOLUME: 147.52 ML
LEFT VENTRICLE MASS INDEX: 74 G/M2
LEFT VENTRICLE SYSTOLIC VOLUME INDEX: 10.3 ML/M2
LEFT VENTRICLE SYSTOLIC VOLUME: 19.27 ML
LEFT VENTRICULAR INTERNAL DIMENSION IN DIASTOLE: 5.5 CM (ref 3.5–6)
LEFT VENTRICULAR MASS: 139.08 G
LV LATERAL E/E' RATIO: 7.73 M/S
LV SEPTAL E/E' RATIO: 8.5 M/S
LYMPHOCYTES # BLD AUTO: 2.3 K/UL (ref 1–4.8)
LYMPHOCYTES NFR BLD: 26.3 % (ref 18–48)
MAGNESIUM SERPL-MCNC: 2.3 MG/DL (ref 1.6–2.6)
MCH RBC QN AUTO: 32.6 PG (ref 27–31)
MCHC RBC AUTO-ENTMCNC: 34.9 G/DL (ref 32–36)
MCV RBC AUTO: 93 FL (ref 82–98)
MONOCYTES # BLD AUTO: 1.3 K/UL (ref 0.3–1)
MONOCYTES NFR BLD: 15 % (ref 4–15)
MV A" WAVE DURATION": 9.13 MSEC
MV MEAN GRADIENT: 1 MMHG
MV PEAK A VEL: 0.79 M/S
MV PEAK E VEL: 0.85 M/S
MV PEAK GRADIENT: 3 MMHG
MV STENOSIS PRESSURE HALF TIME: 51.76 MS
MV VALVE AREA BY CONTINUITY EQUATION: 2.43 CM2
MV VALVE AREA P 1/2 METHOD: 4.25 CM2
NEUTROPHILS # BLD AUTO: 5 K/UL (ref 1.8–7.7)
NEUTROPHILS NFR BLD: 57.4 % (ref 38–73)
NONHDLC SERPL-MCNC: 84 MG/DL
NRBC BLD-RTO: 0 /100 WBC
PISA MRMAX VEL: 0.05 M/S
PISA TR MAX VEL: 2.75 M/S
PLATELET # BLD AUTO: 248 K/UL (ref 150–450)
PMV BLD AUTO: 8.8 FL (ref 9.2–12.9)
POTASSIUM SERPL-SCNC: 5.1 MMOL/L (ref 3.5–5.1)
PROT SERPL-MCNC: 6.7 G/DL (ref 6–8.4)
PULM VEIN S/D RATIO: 0.7
PV PEAK D VEL: 0.57 M/S
PV PEAK S VEL: 0.4 M/S
PV PEAK VELOCITY: 0.85 CM/S
RA MAJOR: 4.65 CM
RA PRESSURE: 3 MMHG
RA WIDTH: 4.02 CM
RBC # BLD AUTO: 3.99 M/UL (ref 4.6–6.2)
RIGHT VENTRICULAR END-DIASTOLIC DIMENSION: 3 CM
RIGHT VENTRICULAR LENGTH IN DIASTOLE (APICAL 4-CHAMBER VIEW): 5.1 CM
RV TISSUE DOPPLER FREE WALL SYSTOLIC VELOCITY 1 (APICAL 4 CHAMBER VIEW): 10.72 CM/S
SODIUM SERPL-SCNC: 137 MMOL/L (ref 136–145)
STJ: 2.81 CM
TDI LATERAL: 0.11 M/S
TDI SEPTAL: 0.1 M/S
TDI: 0.11 M/S
TR MAX PG: 30 MMHG
TRICUSPID ANNULAR PLANE SYSTOLIC EXCURSION: 1.8 CM
TRIGL SERPL-MCNC: 52 MG/DL (ref 30–150)
TV REST PULMONARY ARTERY PRESSURE: 33 MMHG
WBC # BLD AUTO: 8.74 K/UL (ref 3.9–12.7)

## 2022-07-14 PROCEDURE — 96376 TX/PRO/DX INJ SAME DRUG ADON: CPT | Mod: 59

## 2022-07-14 PROCEDURE — 99406 BEHAV CHNG SMOKING 3-10 MIN: CPT | Mod: S$GLB,,,

## 2022-07-14 PROCEDURE — 94761 N-INVAS EAR/PLS OXIMETRY MLT: CPT

## 2022-07-14 PROCEDURE — 99406 PT REFUSED TOBACCO CESSATION: ICD-10-PCS | Mod: S$GLB,,,

## 2022-07-14 PROCEDURE — A4216 STERILE WATER/SALINE, 10 ML: HCPCS | Performed by: NURSE PRACTITIONER

## 2022-07-14 PROCEDURE — 25000003 PHARM REV CODE 250: Performed by: NURSE PRACTITIONER

## 2022-07-14 PROCEDURE — 36415 COLL VENOUS BLD VENIPUNCTURE: CPT | Performed by: NURSE PRACTITIONER

## 2022-07-14 PROCEDURE — 80061 LIPID PANEL: CPT | Performed by: NURSE PRACTITIONER

## 2022-07-14 PROCEDURE — 63600175 PHARM REV CODE 636 W HCPCS: Performed by: NURSE PRACTITIONER

## 2022-07-14 PROCEDURE — G0378 HOSPITAL OBSERVATION PER HR: HCPCS

## 2022-07-14 PROCEDURE — 85025 COMPLETE CBC W/AUTO DIFF WBC: CPT | Performed by: NURSE PRACTITIONER

## 2022-07-14 PROCEDURE — 80053 COMPREHEN METABOLIC PANEL: CPT | Performed by: NURSE PRACTITIONER

## 2022-07-14 PROCEDURE — 27000221 HC OXYGEN, UP TO 24 HOURS

## 2022-07-14 PROCEDURE — 83735 ASSAY OF MAGNESIUM: CPT | Performed by: NURSE PRACTITIONER

## 2022-07-14 RX ORDER — METHOCARBAMOL 500 MG/1
500 TABLET, FILM COATED ORAL ONCE
Status: COMPLETED | OUTPATIENT
Start: 2022-07-14 | End: 2022-07-14

## 2022-07-14 RX ADMIN — MORPHINE SULFATE 2 MG: 2 INJECTION, SOLUTION INTRAMUSCULAR; INTRAVENOUS at 01:07

## 2022-07-14 RX ADMIN — DOCUSATE SODIUM AND SENNOSIDES 1 TABLET: 8.6; 5 TABLET, FILM COATED ORAL at 09:07

## 2022-07-14 RX ADMIN — MORPHINE SULFATE 2 MG: 2 INJECTION, SOLUTION INTRAMUSCULAR; INTRAVENOUS at 10:07

## 2022-07-14 RX ADMIN — METHOCARBAMOL TABLETS 500 MG: 500 TABLET, COATED ORAL at 08:07

## 2022-07-14 RX ADMIN — Medication 10 ML: at 05:07

## 2022-07-14 RX ADMIN — MORPHINE SULFATE 2 MG: 2 INJECTION, SOLUTION INTRAMUSCULAR; INTRAVENOUS at 06:07

## 2022-07-14 NOTE — HOSPITAL COURSE
He was evaluated in the ED. His troponin was trended. His Echo was ordered. Appreciate consult from cardiology. Will need follow-up from cardiology outpatient. He will also need to drug cessation counseling outpatient.

## 2022-07-14 NOTE — ED NOTES
Pt c/o pain, rated 10 out of 10. Administered PRN morphine. Provided pt w/ pillow and blanket. CB within reach.

## 2022-07-14 NOTE — PLAN OF CARE
Discharge orders noted. Awaiting discharge order reconciliation by medical team in order to finalize AVS.

## 2022-07-14 NOTE — DISCHARGE SUMMARY
[facility] Medicine  Discharge Summary      Patient Name: Dell Sewell III  MRN: [id number]  Patient Class: OP- Observation  Admission Date: 7/13/2022  Hospital Length of Stay: 0 days  Discharge Date and Time:  07/14/2022 9:13 AM  Attending Physician: Jackeline Atkinson*   Discharging Provider: Luis Manuel Reyes NP  Primary Care Provider: Aman Griggs MD (Inactive)      HPI:   Mr. Dell Sewell is a 55 y/o with a pmh of HTN, HLD, and cocaine abuse. He has no PCP. Has recently used cocaine within the past 2 weeks per the patient. presenting with complaints of CP since 0400. He states the pain is left sided and radiates to his neck.  He is + for tachypnea and he feels anxious. Pt is tearful. He denies SOB, palpitations, fever, chills, lower ext pain, nausea, vomiting, or diarrhea. He denies trauma.     His ED workup includes normal troponin, WBC--14.83, stable D-dimer, troponin stable, UDS + for cocaine and opiates. He is COVID negative. CXR--No convincing evidence of acute cardiopulmonary disease. Urinalysis is pending, blood cultures are pending. Will follow procal and lactate. We will admit him to the [facility] service for further care.      * No surgery found *      Hospital Course:   He was evaluated in the ED. His troponin was trended. His Echo was ordered. Appreciate consult from cardiology. Will need follow-up from cardiology outpatient. He will also need to drug cessation counseling outpatient. Of note his WBC on admission was 14 now 9--ok for dc home.       Goals of Care Treatment Preferences:  Code Status: Full Code      Consults:   Consults (From admission, onward)        Status Ordering Provider     Inpatient consult to Social Work  Once        Provider:  (Not yet assigned)    Acknowledged JACKELINE ATKINSON     Inpatient consult to Cardiology-[employer]  Once        Provider:  Jono Rodrigues MD    Completed LUIS MANUEL REYES          No new Assessment & Plan  notes have been filed under this hospital service since the last note was generated.  Service: Hospital Medicine    Final Active Diagnoses:    Diagnosis Date Noted POA    PRINCIPAL PROBLEM:  Costochondritis [M94.0] 07/14/2022 Yes    Essential hypertension [I10] 06/09/2015 Yes    Cocaine abuse [F14.10] 06/08/2015 Yes      Problems Resolved During this Admission:    Diagnosis Date Noted Date Resolved POA    Chest pain [R07.9] 07/13/2022 07/14/2022 Yes       Discharged Condition: stable    Disposition: Home or Self Care    Follow Up:    Patient Instructions:      Diet Cardiac     Notify your health care provider if you experience any of the following:  temperature >100.4     Notify your health care provider if you experience any of the following:  persistent nausea and vomiting or diarrhea     Notify your health care provider if you experience any of the following:  severe uncontrolled pain     Notify your health care provider if you experience any of the following:  redness, tenderness, or signs of infection (pain, swelling, redness, odor or green/yellow discharge around incision site)     Notify your health care provider if you experience any of the following:  difficulty breathing or increased cough     Notify your health care provider if you experience any of the following:  worsening rash     Notify your health care provider if you experience any of the following:  persistent dizziness, light-headedness, or visual disturbances     Activity as tolerated       Significant Diagnostic Studies: Labs: All labs within the past 24 hours have been reviewed    Pending Diagnostic Studies:     Procedure Component Value Units Date/Time    Echo [[id number]]  (Abnormal) Resulted: 07/14/22 0857    Order Status: Sent Lab Status: In process Updated: 07/14/22 0857     Ascending aorta 3.48 cm      STJ 2.81 cm      AV mean gradient 5 mmHg      Ao peak mushtaq 1.49 m/s      Ao VTI 29.71 cm      IVRT 82.78 msec      IVS 0.84 cm      LA  "size 3.78 cm      Left Atrium Major Axis 4.96 cm      Left Atrium Minor Axis 5.49 cm      LVIDd 5.50 cm      LVIDs 2.36 cm      LVOT diameter 2.09 cm      LVOT peak VTI 20.75 cm      Posterior Wall 0.59 cm      MV Peak A Dheeraj 0.79 m/s      E wave deceleration time 178.47 msec      MV Peak E Dheeraj 0.85 m/s      PV Peak D Dheeraj 0.57 m/s      PV Peak S Dheeraj 0.40 m/s      RA Major Axis 4.65 cm      RA Width 4.02 cm      TAPSE 2.34 cm      TR Max Dheeraj 2.75 m/s      TDI LATERAL 0.11 m/s      TDI SEPTAL 0.10 m/s      LA WIDTH 4.03 cm      Ao root annulus 2.53 cm      PV PEAK VELOCITY 0.85 cm/s      MV stenosis pressure 1/2 time 51.76 ms      LV Diastolic Volume 147.52 mL      LV Systolic Volume 19.27 mL      LVOT peak dheeraj 1.11 m/s      Mr max dheeraj 0.05 m/s      LA volume (mod) 36.92 cm3      MV "A" wave duration 9.13 msec      MV mean gradient 1 mmHg      MV peak gradient 3 mmHg      RV S' 10.72 cm/s      MV VTI 29.31 cm      LV LATERAL E/E' RATIO 7.73 m/s      LV SEPTAL E/E' RATIO 8.50 m/s      FS 57 %      LA volume 67.48 cm3      LV mass 139.08 g      Left Ventricle Relative Wall Thickness 0.21 cm      AV valve area 2.39 cm2      AV Velocity Ratio 0.74     AV index (prosthetic) 0.70     MV valve area p 1/2 method 4.25 cm2      MV valve area by continuity eq 2.43 cm2      E/A ratio 1.08     Mean e' 0.11 m/s      Pulm vein S/D ratio 0.70     LVOT area 3.4 cm2      LVOT stroke volume 71.15 cm3      AV peak gradient 9 mmHg      E/E' ratio 8.10 m/s      LV Systolic Volume Index 10.3 mL/m2      LV Diastolic Volume Index 78.47 mL/m2      LA Volume Index 35.9 mL/m2      LV Mass Index 74 g/m2      Triscuspid Valve Regurgitation Peak Gradient 30 mmHg      LA Volume Index (Mod) 19.6 mL/m2      BSA 1.89 m2          Medications:  Reconciled Home Medications:      Medication List      STOP taking these medications    diazePAM 5 MG tablet  Commonly known as: VALIUM     NIFEdipine 10 MG Cap  Commonly known as: PROCARDIA            Indwelling " Lines/Drains at time of discharge:   Lines/Drains/Airways     None                 Time spent on the discharge of patient: 35 minutes         Luis Manuel Reyes NP  Department of [facility] Medicine  [facility]

## 2022-07-14 NOTE — NURSING
Patient discharging home. All discharge instructions reviewed with VN. IV and telemetry removed, patient tolerated well.

## 2022-07-14 NOTE — PROGRESS NOTES
"  Smoking cessation education note: Pt is a 0.5 pk/day cigarette smoker x 42 yrs. He denies nicotine withdrawal symptoms and states that he does not wish to use the nicotine patch during this hospital admission. Pt not ready to quit smoking, stating "I'll quit when I die". Handout for [facility] Smoking Cessation clinic provided.   "

## 2022-07-14 NOTE — PROGRESS NOTES
07/13/22 2136   Admission   Initial VN Admission Questions Complete   Communication Issues? None   Shift   Virtual Nurse - Patient Verbalized Approval Of Camera Use   Safety/Activity   Patient Rounds visualized patient   Safety Promotion/Fall Prevention instructed to call staff for mobility;Fall Risk reviewed with patient/family;side rails raised x 2   VIRTUAL NURSE: Admission questions completed with patient at this time. Care plan and safety precautions reviewed. Pt verbalized no complaints, no needs expressed. Nasal cannula in place. Instructed to use call light for assistance, he  verbalized understanding.

## 2022-07-14 NOTE — PLAN OF CARE
SW utilized [employer] to complete assessment. Pt is AxO 3 and able to verbally answer assessment questions.  Pt confirmed demographic information. Pt reports living alone and feeling safe. Pt reports coordinating his own discharge. SW informed pt of public transportation via bus, Uber, Taxi, and his ability to wait in lobby for family to arrive.  Pt confirmed no PCP SW to request PCC appointment for hospital follow up. Pt reports being independent of ADL's and no DME in use. Pt reports having support of his brother Zachary who remain pt emergency contact.        07/14/22 7253   Discharge Planning   Assessment Type Discharge Planning Brief Assessment   Resource/Environmental Concerns none   Support Systems Family members;Friends/neighbors   Equipment Currently Used at Home none   Current Living Arrangements home/apartment/condo   Patient/Family Anticipates Transition to home   Patient/Family Anticipated Services at Transition none   DME Needed Upon Discharge  none   Discharge Plan A Home     Future Appointments   Date Time Provider Department Center   7/22/2022  1:00 PM Gisele Mayes MD [facility]  [phone]

## 2022-07-14 NOTE — PLAN OF CARE
At time of discharge pt is provided the options of waiting in the lobby for family to  and transport home, utilize Uber or Taxi services, or SW can assist in bus transportation back to pt home. Pt report not needing assistance and will figure out transport. Pt reports to be familiar with PCC and is scheduled for hospital follow up. Pharmacist will go over home medications and reasons for medications. VN and bedside nurse to reiterate final discharge instructions.         07/14/22 1332   Final Note   Assessment Type Final Discharge Note   Anticipated Discharge Disposition Home   What phone number can be called within the next 1-3 days to see how you are doing after discharge? [phone]   Hospital Resources/Appts/Education Provided Appointments scheduled and added to AVS   Post-Acute Status   Discharge Delays None known at this time     Future Appointments   Date Time Provider Department Center   7/22/2022  1:00 PM Gisele Mayes MD [facility]  [phone]

## 2022-07-19 LAB
BACTERIA BLD CULT: NORMAL
BACTERIA BLD CULT: NORMAL

## 2022-07-22 ENCOUNTER — TELEPHONE (OUTPATIENT)
Dept: PRIMARY CARE CLINIC | Facility: CLINIC | Age: 57
End: 2022-07-22
Payer: MEDICAID

## 2022-07-22 NOTE — TELEPHONE ENCOUNTER
Returned patients call and rescheduled cancelled appointment for today to Monday august 1st at 1:30 pm

## 2022-07-22 NOTE — TELEPHONE ENCOUNTER
----- Message from Ashtyn Tapia sent at 7/22/2022  7:51 AM CDT -----  Contact: [phone]  Who Called: PT  Regarding: pt would like to reschedule hospital follow up scheduled for today   Would the patient rather a call back or a response via [facility]? Call back  Best Call Back Number: [phone]  Additional Information: n/a

## 2022-08-11 ENCOUNTER — OFFICE VISIT (OUTPATIENT)
Dept: PRIMARY CARE CLINIC | Facility: CLINIC | Age: 57
End: 2022-08-11
Payer: MEDICAID

## 2022-08-11 VITALS
BODY MASS INDEX: 25.29 KG/M2 | TEMPERATURE: 98 F | OXYGEN SATURATION: 96 % | HEART RATE: 103 BPM | DIASTOLIC BLOOD PRESSURE: 91 MMHG | WEIGHT: 166.88 LBS | HEIGHT: 68 IN | SYSTOLIC BLOOD PRESSURE: 159 MMHG

## 2022-08-11 DIAGNOSIS — I10 HYPERTENSION, UNSPECIFIED TYPE: ICD-10-CM

## 2022-08-11 DIAGNOSIS — R07.9 CHEST PAIN, UNSPECIFIED TYPE: Primary | ICD-10-CM

## 2022-08-11 DIAGNOSIS — F14.10 COCAINE ABUSE: ICD-10-CM

## 2022-08-11 DIAGNOSIS — Z72.0 TOBACCO ABUSE: ICD-10-CM

## 2022-08-11 PROCEDURE — 1159F PR MEDICATION LIST DOCUMENTED IN MEDICAL RECORD: ICD-10-PCS | Mod: CPTII,,, | Performed by: INTERNAL MEDICINE

## 2022-08-11 PROCEDURE — 3080F PR MOST RECENT DIASTOLIC BLOOD PRESSURE >= 90 MM HG: ICD-10-PCS | Mod: CPTII,,, | Performed by: INTERNAL MEDICINE

## 2022-08-11 PROCEDURE — 99214 OFFICE O/P EST MOD 30 MIN: CPT | Mod: PBBFAC,PO | Performed by: INTERNAL MEDICINE

## 2022-08-11 PROCEDURE — 4010F ACE/ARB THERAPY RXD/TAKEN: CPT | Mod: CPTII,,, | Performed by: INTERNAL MEDICINE

## 2022-08-11 PROCEDURE — 99999 PR PBB SHADOW E&M-EST. PATIENT-LVL IV: ICD-10-PCS | Mod: PBBFAC,,, | Performed by: INTERNAL MEDICINE

## 2022-08-11 PROCEDURE — 3080F DIAST BP >= 90 MM HG: CPT | Mod: CPTII,,, | Performed by: INTERNAL MEDICINE

## 2022-08-11 PROCEDURE — 3077F PR MOST RECENT SYSTOLIC BLOOD PRESSURE >= 140 MM HG: ICD-10-PCS | Mod: CPTII,,, | Performed by: INTERNAL MEDICINE

## 2022-08-11 PROCEDURE — 3008F PR BODY MASS INDEX (BMI) DOCUMENTED: ICD-10-PCS | Mod: CPTII,,, | Performed by: INTERNAL MEDICINE

## 2022-08-11 PROCEDURE — 3008F BODY MASS INDEX DOCD: CPT | Mod: CPTII,,, | Performed by: INTERNAL MEDICINE

## 2022-08-11 PROCEDURE — 1159F MED LIST DOCD IN RCRD: CPT | Mod: CPTII,,, | Performed by: INTERNAL MEDICINE

## 2022-08-11 PROCEDURE — 3044F PR MOST RECENT HEMOGLOBIN A1C LEVEL <7.0%: ICD-10-PCS | Mod: CPTII,,, | Performed by: INTERNAL MEDICINE

## 2022-08-11 PROCEDURE — 99205 PR OFFICE/OUTPT VISIT, NEW, LEVL V, 60-74 MIN: ICD-10-PCS | Mod: S$PBB,,, | Performed by: INTERNAL MEDICINE

## 2022-08-11 PROCEDURE — 99205 OFFICE O/P NEW HI 60 MIN: CPT | Mod: S$PBB,,, | Performed by: INTERNAL MEDICINE

## 2022-08-11 PROCEDURE — 1160F PR REVIEW ALL MEDS BY PRESCRIBER/CLIN PHARMACIST DOCUMENTED: ICD-10-PCS | Mod: CPTII,,, | Performed by: INTERNAL MEDICINE

## 2022-08-11 PROCEDURE — 4010F PR ACE/ARB THEARPY RXD/TAKEN: ICD-10-PCS | Mod: CPTII,,, | Performed by: INTERNAL MEDICINE

## 2022-08-11 PROCEDURE — 3044F HG A1C LEVEL LT 7.0%: CPT | Mod: CPTII,,, | Performed by: INTERNAL MEDICINE

## 2022-08-11 PROCEDURE — 3077F SYST BP >= 140 MM HG: CPT | Mod: CPTII,,, | Performed by: INTERNAL MEDICINE

## 2022-08-11 PROCEDURE — 1160F RVW MEDS BY RX/DR IN RCRD: CPT | Mod: CPTII,,, | Performed by: INTERNAL MEDICINE

## 2022-08-11 PROCEDURE — 99999 PR PBB SHADOW E&M-EST. PATIENT-LVL IV: CPT | Mod: PBBFAC,,, | Performed by: INTERNAL MEDICINE

## 2022-08-11 RX ORDER — LOSARTAN POTASSIUM 25 MG/1
25 TABLET ORAL DAILY
Qty: 30 TABLET | Refills: 6 | Status: SHIPPED | OUTPATIENT
Start: 2022-08-11 | End: 2023-08-11

## 2022-08-11 NOTE — PROGRESS NOTES
[facility]   New Visit Progress Note   Recent Hospital Discharge     PRESENTING HISTORY     Chief Complaint/Reason for Admission:  Follow up Hospital Discharge   PCP: Aman Griggs MD (Inactive)    History of Present Illness:  Mr. Dell Sewell III is a 57 y.o. male who was recently admitted to the hospital.    [facility] Medicine  Discharge Summary        Patient Name: Dell Sewell III  MRN: [id number]  Patient Class: OP- Observation  Admission Date: 7/13/2022  Hospital Length of Stay: 0 days  Discharge Date and Time:  07/14/2022 9:13 AM  Attending Physician: Jackeline Atkinson*   Discharging Provider: Luis Manuel Reyes NP  Primary Care Provider: Aman Griggs MD (Inactive)  ___________________________________________________________________    Today:  [facility] to [facility] for initial hospital follow up.  Recently hospitalized for evaluation of chest pain.  Troponin trended NEG.   EKG with sinus tachycardia, non ischemic.   Chest X Ray unremarkable.   TTE with EF 55%.   U tox positive for cocaine and opiates.   Patient seen in consultation with Cardiology team.  Concern for musculoskeletal chest pain in combination with polysubstance abuse.  Medical management recommended.   Patient discharged to home.    Patient unaccompanied today.  Ambulatory and independent with ADL's.  Unclear compliance with medication regimen (Procardia XL).   Patient is poor historian.   Endorses continued cocaine use.   Does not have PCP.     Review of Systems  General ROS: negative for chills, fever or weight loss  Psychological ROS: negative for hallucination, depression or suicidal ideation  Ophthalmic ROS: negative for blurry vision, photophobia or eye pain  ENT ROS: negative for epistaxis, sore throat or rhinorrhea  Respiratory ROS: no cough, shortness of breath, or wheezing  Cardiovascular ROS: + chest pain with radiation to Left shoulder , no  dyspnea on exertion  Gastrointestinal ROS: no  "abdominal pain, change in bowel habits, or black/ bloody stools  Genito-Urinary ROS: no dysuria, trouble voiding, or hematuria  Musculoskeletal ROS: negative for gait disturbance or muscular weakness  Neurological ROS: no syncope or seizures; no ataxia  Dermatological ROS: negative for pruritis, rash and jaundice      PAST HISTORY:     Past Medical History:   Diagnosis Date    High cholesterol     Hypertension        Past Surgical History:   Procedure Laterality Date    CHOLECYSTECTOMY         No family history on file.      MEDICATIONS & ALLERGIES:     Current Outpatient Medications on File Prior to Visit   Medication Sig Dispense Refill    [DISCONTINUED] diazepam (VALIUM) 5 MG tablet 10mg PO Q6 hours x 3, 5mg PO Q6 hrs x 3, 2.5mg PO Q6 hrs x 3 (Patient not taking: Reported on 7/13/2022) 11 tablet 0    [DISCONTINUED] nifedipine (PROCARDIA) 10 MG Cap Take 1 capsule (10 mg total) by mouth every 8 (eight) hours. (Patient not taking: Reported on 7/13/2022) 90 capsule 1     No current facility-administered medications on file prior to visit.        Review of patient's allergies indicates:  No Known Allergies    OBJECTIVE:     Vital Signs:  BP (!) 159/91   Pulse 103   Temp 98.4 °F (36.9 °C)   Ht 5' 8" (1.727 m)   Wt 75.7 kg (166 lb 14.2 oz)   SpO2 96%   BMI 25.38 kg/m²   Wt Readings from Last 3 Encounters:   08/11/22 1458 75.7 kg (166 lb 14.2 oz)   07/14/22 0753 74.8 kg (165 lb)   07/13/22 2125 75.2 kg (165 lb 12.6 oz)   07/13/22 1039 81.6 kg (180 lb)   05/03/16 0717 81.6 kg (180 lb)     Body mass index is 25.38 kg/m².        Physical Exam:  BP (!) 159/91   Pulse 103   Temp 98.4 °F (36.9 °C)   Ht 5' 8" (1.727 m)   Wt 75.7 kg (166 lb 14.2 oz)   SpO2 96%   BMI 25.38 kg/m²   General appearance: alert, cooperative, no distress  Constitutional:Oriented to person, place, and time  + appears well-developed and well-nourished.   HEENT: Normocephalic, atraumatic, neck symmetrical, no nasal discharge   Eyes: " conjunctivae/corneas clear, PERRL, EOM's intact  Lungs: clear to auscultation bilaterally, no dullness to percussion bilaterally  Heart: regular rate and rhythm without rub; no displacement of the PMI   Abdomen: soft, non-tender; bowel sounds normoactive; no organomegaly  Extremities: extremities symmetric; no clubbing, cyanosis, or edema  Integument: Skin color, texture, turgor normal; no rashes; hair distrubution normal  Neurologic: Alert and oriented X 3, normal strength, normal coordination and gait  Psychiatric: no pressured speech; normal affect; no evidence of impaired cognition     Laboratory  Lab Results   Component Value Date    WBC 8.74 07/14/2022    HGB 13.0 (L) 07/14/2022    HCT 37.2 (L) 07/14/2022    MCV 93 07/14/2022     07/14/2022     BMP  Lab Results   Component Value Date     07/14/2022    K 5.1 07/14/2022     07/14/2022    CO2 26 07/14/2022    BUN 11 07/14/2022    CREATININE 0.7 07/14/2022    CALCIUM 9.2 07/14/2022    ANIONGAP 11 07/14/2022    ESTGFRAFRICA >60 07/14/2022    EGFRNONAA >60 07/14/2022     Lab Results   Component Value Date    ALT 16 07/14/2022    AST 21 07/14/2022    ALKPHOS 63 07/14/2022    BILITOT 0.7 07/14/2022     Lab Results   Component Value Date    INR 1.0 06/08/2015     Lab Results   Component Value Date    HGBA1C 5.8 (H) 07/13/2022       Diagnostic Results:    2 D echo 7/13/22:  · The left ventricle is normal in size with normal systolic function.  · The estimated ejection fraction is 55%.  · Normal left ventricular diastolic function.  · Normal right ventricular size with normal right ventricular systolic function.  · Mild left atrial enlargement.  · The estimated PA systolic pressure is 33 mmHg.  · Normal central venous pressure (3 mmHg).    ASSESSMENT & PLAN:     Chest pain, unspecified type  - NEG cardiac work up while hospitalized but patient with continued chest pain/radiation to left shoulder in setting of continued cocaine use  - see Cardiology  team 10/6/22  -     Ambulatory referral/consult to Cardiology; Future; Expected date: 08/18/2022    Hypertension, unspecified type  - not at goal, add Losartan   - stop cocaine  - sodium restriction, ambulatory monitoring   -     losartan (COZAAR) 25 MG tablet; Take 1 tablet (25 mg total) by mouth once daily.  Dispense: 30 tablet; Refill: 6    Tobacco abuse  - smokes 1/2 ppd  -     Ambulatory referral/consult to Smoking Cessation Program; Future; Expected date: 08/18/2022    Cocaine abuse  - endorses ongoing cocaine use  - likely a significant contributing factor to his chest pain and HTN  - counseling and education provided     [facility] will [facility].  He will see Dr Bowers, [facility], 9/15/22, to establish new primary care.     Instructions for the patient:      Scheduled Follow-up :  Future Appointments   Date Time Provider Department Center   9/15/2022  3:20 PM Chay Bowers MD [facility]   10/6/2022  3:45 PM Jono Rodrigues MD [facility]       Post Visit Medication List:     Medication List          Accurate as of August 11, 2022 11:59 PM. If you have any questions, ask your nurse or doctor.            START taking these medications    losartan 25 MG tablet  Commonly known as: COZAAR  Take 1 tablet (25 mg total) by mouth once daily.  Started by: Gisele Mayes MD        STOP taking these medications    diazePAM 5 MG tablet  Commonly known as: VALIUM  Stopped by: Gisele Mayes MD           Where to Get Your Medications      These medications were sent to [facility] [address], [place] [address]    Hours: Mon-Fri, 8a-5:30p Phone: [phone]   · losartan 25 MG tablet         Signing Physician:  Gisele Mayes MD

## 2022-08-12 PROBLEM — Z72.0 TOBACCO ABUSE: Status: ACTIVE | Noted: 2022-08-12

## 2022-08-12 PROBLEM — I10 HYPERTENSION: Status: ACTIVE | Noted: 2022-08-12

## 2022-08-12 PROBLEM — R07.9 CHEST PAIN: Status: ACTIVE | Noted: 2022-08-12

## 2022-09-15 ENCOUNTER — CLINICAL SUPPORT (OUTPATIENT)
Dept: SMOKING CESSATION | Facility: CLINIC | Age: 57
End: 2022-09-15

## 2022-09-15 DIAGNOSIS — F17.210 CIGARETTE SMOKER: Primary | ICD-10-CM

## 2022-09-15 PROCEDURE — 99404 PR PREVENT COUNSEL,INDIV,60 MIN: ICD-10-PCS | Mod: S$GLB,,,

## 2022-09-15 PROCEDURE — 99999 PR PBB SHADOW E&M-EST. PATIENT-LVL II: ICD-10-PCS | Mod: PBBFAC,,,

## 2022-09-15 PROCEDURE — 99404 PREV MED CNSL INDIV APPRX 60: CPT | Mod: S$GLB,,,

## 2022-09-15 PROCEDURE — 99999 PR PBB SHADOW E&M-EST. PATIENT-LVL II: CPT | Mod: PBBFAC,,,

## 2022-09-15 RX ORDER — DM/P-EPHED/ACETAMINOPH/DOXYLAM 30-7.5/3
2 LIQUID (ML) ORAL
Qty: 72 LOZENGE | Refills: 0 | Status: SHIPPED | OUTPATIENT
Start: 2022-09-15 | End: 2022-09-29 | Stop reason: SDUPTHER

## 2022-09-15 RX ORDER — IBUPROFEN 200 MG
1 TABLET ORAL DAILY
Qty: 14 PATCH | Refills: 0 | Status: SHIPPED | OUTPATIENT
Start: 2022-09-15 | End: 2022-09-29 | Stop reason: SDUPTHER

## 2022-09-15 NOTE — Clinical Note
CESD of 7 is perceived as no mental distress or depression at this time.FTND of  7 indicates a moderate level of tobacco/nicotine dependency. Exhaled carbon monoxide level was 15 ppm per Smokerlyzer (0-6 non-smoker). The patient will begin the prescribed tobacco cessation medication regimen of 21 mg patch and 2 mg Lozg. Ways to combat nicotine craving were discussed with patient.

## 2022-09-29 ENCOUNTER — CLINICAL SUPPORT (OUTPATIENT)
Dept: SMOKING CESSATION | Facility: CLINIC | Age: 57
End: 2022-09-29
Payer: COMMERCIAL

## 2022-09-29 DIAGNOSIS — F17.210 CIGARETTE SMOKER: ICD-10-CM

## 2022-09-29 PROCEDURE — 99999 PR PBB SHADOW E&M-EST. PATIENT-LVL I: CPT | Mod: PBBFAC,,,

## 2022-09-29 PROCEDURE — 99999 PR PBB SHADOW E&M-EST. PATIENT-LVL I: ICD-10-PCS | Mod: PBBFAC,,,

## 2022-09-29 PROCEDURE — 99402 PR PREVENT COUNSEL,INDIV,30 MIN: ICD-10-PCS | Mod: S$PBB,,,

## 2022-09-29 PROCEDURE — 99402 PREV MED CNSL INDIV APPRX 30: CPT | Mod: S$PBB,,,

## 2022-09-29 RX ORDER — IBUPROFEN 200 MG
1 TABLET ORAL DAILY
Qty: 14 PATCH | Refills: 0 | Status: SHIPPED | OUTPATIENT
Start: 2022-09-29

## 2022-09-29 RX ORDER — DM/P-EPHED/ACETAMINOPH/DOXYLAM 30-7.5/3
2 LIQUID (ML) ORAL
Qty: 72 LOZENGE | Refills: 0 | Status: SHIPPED | OUTPATIENT
Start: 2022-09-29

## 2022-09-29 NOTE — Clinical Note
Spoke with patient at length by telephone for a smoking cessation follow up. Patient states he is not feeling good (probably flu). Patient smokes 8 cpd. The patient remains on the prescribed tobacco cessation medication regimen of 21 mg patch and 2 mg Lozg without any negative side effects at this time. The patient will continue with  therapy sessions and medication monitoring by CTTS. Prescribed medication management will be by physician. Reviewed coping strategies and Habitual behavior with patient.

## 2022-09-29 NOTE — PROGRESS NOTES
Individual Follow-Up Form    9/29/2022    Quit Date: TBD     Clinical Status of Patient: Outpatient    Length of Service: 30 minutes    Continuing Medication: yes  Patches or Nicotine Lozenges    Other Medications: none     Target Symptoms: Withdrawal and medication side effects. The following were  rated moderate (3) to severe (4) on TCRS:  Moderate (3): none  Severe (4): none    Comments: Spoke with patient at length by telephone for a smoking cessation follow up. Patient states he is not feeling good (probably flu). Patient smokes 8 cpd. The patient remains on the prescribed tobacco cessation medication regimen of 21 mg patch and 2 mg Lozg without any negative side effects at this time. The patient will continue with  therapy sessions and medication monitoring by CTTS. Prescribed medication management will be by physician. Reviewed coping strategies and Habitual behavior with patient.    Diagnosis: F17.210    Next Visit: 2 weeks

## 2022-10-06 ENCOUNTER — TELEPHONE (OUTPATIENT)
Dept: CARDIOLOGY | Facility: CLINIC | Age: 57
End: 2022-10-06
Payer: MEDICAID

## 2022-10-06 NOTE — TELEPHONE ENCOUNTER
----- Message from Ai Cota, Patient Care Assistant sent at 10/6/2022  1:35 PM CDT -----  Type:  Needs Medical Advice    Who Called:  pt  Symptoms (please be specific):  Patient would like a call back regarding getting his appt reschedule    Would the patient rather a call back or a response via [facility]? Please call  Best Call Back Number:  [phone]  Additional Information:

## 2022-10-13 ENCOUNTER — CLINICAL SUPPORT (OUTPATIENT)
Dept: SMOKING CESSATION | Facility: CLINIC | Age: 57
End: 2022-10-13
Payer: COMMERCIAL

## 2022-10-13 DIAGNOSIS — F17.210 CIGARETTE SMOKER: Primary | ICD-10-CM

## 2022-10-13 PROCEDURE — 99999 PR PBB SHADOW E&M-EST. PATIENT-LVL I: ICD-10-PCS | Mod: PBBFAC,,,

## 2022-10-13 PROCEDURE — 99999 PR PBB SHADOW E&M-EST. PATIENT-LVL I: CPT | Mod: PBBFAC,,,

## 2022-10-13 PROCEDURE — 99402 PR PREVENT COUNSEL,INDIV,30 MIN: ICD-10-PCS | Mod: S$PBB,,,

## 2022-10-13 PROCEDURE — 99402 PREV MED CNSL INDIV APPRX 30: CPT | Mod: S$PBB,,,

## 2022-10-13 NOTE — PROGRESS NOTES
Individual Follow-Up Form    10/13/2022    Quit Date: TBD    Clinical Status of Patient: Outpatient    Length of Service: 30 minutes    Continuing Medication: yes  Patches or Nicotine Lozenges    Other Medications: none     Target Symptoms: Withdrawal and medication side effects. The following were  rated moderate (3) to severe (4) on TCRS:  Moderate (3): none  Severe (4): none    Comments: Spoke with patient at length by telephone for a smoking cessation follow up. Patient smokes 10 cpd. Patient stated patches are not working, but not compliant wearing the patch daily. Encouraged pt to wear patch daily. The patient remains on the prescribed tobacco cessation medication regimen of 21 mg patch and 2 mg Lozg without any negative side effects at this time. The patient will continue with  therapy sessions and medication monitoring by CTTS. Prescribed medication management will be by physician.    Diagnosis: F17.210    Next Visit: 2 weeks

## 2022-10-27 ENCOUNTER — TELEPHONE (OUTPATIENT)
Dept: SMOKING CESSATION | Facility: CLINIC | Age: 57
End: 2022-10-27
Payer: MEDICAID

## 2022-10-27 NOTE — TELEPHONE ENCOUNTER
Attempted to call patient for their smoking cessation appointment. Left a message with my contact information to reschedule the appointment. Kamilla Vega [phone].

## 2023-01-17 ENCOUNTER — CLINICAL SUPPORT (OUTPATIENT)
Dept: SMOKING CESSATION | Facility: CLINIC | Age: 58
End: 2023-01-17

## 2023-01-17 DIAGNOSIS — F17.200 NICOTINE DEPENDENCE: Primary | ICD-10-CM

## 2023-01-17 PROCEDURE — 99407 BEHAV CHNG SMOKING > 10 MIN: CPT | Mod: S$GLB,,,

## 2023-01-17 PROCEDURE — 99407 PR TOBACCO USE CESSATION INTENSIVE >10 MINUTES: ICD-10-PCS | Mod: S$GLB,,,

## 2023-01-17 NOTE — PROGRESS NOTES
Spoke with patient today in regard to smoking cessation progress for 3 month telephone follow up, he states not tobacco free. Patient states no interest in returning to the program at this time and would like to be called at a later date. Informed patient of benefit period, future follow ups, and contact information if any further help or support is needed. Will complete smart form for 3 month follow up on Quit attempt #1.

## 2023-02-15 ENCOUNTER — HOSPITAL ENCOUNTER (OUTPATIENT)
Dept: RADIOLOGY | Facility: HOSPITAL | Age: 58
Discharge: HOME | End: 2023-02-15
Attending: NURSE PRACTITIONER
Payer: MEDICAID

## 2023-02-15 DIAGNOSIS — M54.16 LUMBAR RADICULOPATHY: Primary | ICD-10-CM

## 2023-02-15 DIAGNOSIS — M54.16 LUMBAR RADICULOPATHY: ICD-10-CM

## 2023-02-15 PROCEDURE — 72100 X-RAY EXAM L-S SPINE 2/3 VWS: CPT | Mod: TC,FY

## 2023-02-15 PROCEDURE — 72100 XR LUMBAR SPINE 2 OR 3 VIEWS: ICD-10-PCS | Mod: 26,,, | Performed by: STUDENT IN AN ORGANIZED HEALTH CARE EDUCATION/TRAINING PROGRAM

## 2023-02-15 PROCEDURE — 72100 X-RAY EXAM L-S SPINE 2/3 VWS: CPT | Mod: 26,,, | Performed by: STUDENT IN AN ORGANIZED HEALTH CARE EDUCATION/TRAINING PROGRAM

## 2023-03-03 DIAGNOSIS — S32.040S WEDGE COMPRESSION FRACTURE OF FOURTH LUMBAR VERTEBRA, SEQUELA: Primary | ICD-10-CM

## 2023-03-16 ENCOUNTER — CLINICAL SUPPORT (OUTPATIENT)
Dept: SMOKING CESSATION | Facility: CLINIC | Age: 58
End: 2023-03-16

## 2023-03-16 DIAGNOSIS — F17.200 NICOTINE DEPENDENCE: Primary | ICD-10-CM

## 2023-03-16 PROCEDURE — 99999 PR PBB SHADOW E&M-EST. PATIENT-LVL I: CPT | Mod: PBBFAC,,,

## 2023-03-16 PROCEDURE — 99407 PR TOBACCO USE CESSATION INTENSIVE >10 MINUTES: ICD-10-PCS | Mod: S$GLB,,,

## 2023-03-16 PROCEDURE — 99999 PR PBB SHADOW E&M-EST. PATIENT-LVL I: ICD-10-PCS | Mod: PBBFAC,,,

## 2023-03-16 PROCEDURE — 99407 BEHAV CHNG SMOKING > 10 MIN: CPT | Mod: S$GLB,,,

## 2023-07-06 ENCOUNTER — HOSPITAL ENCOUNTER (EMERGENCY)
Facility: HOSPITAL | Age: 58
Discharge: HOME | End: 2023-07-06
Attending: EMERGENCY MEDICINE
Payer: MEDICAID

## 2023-07-06 VITALS
DIASTOLIC BLOOD PRESSURE: 89 MMHG | OXYGEN SATURATION: 99 % | RESPIRATION RATE: 20 BRPM | HEART RATE: 65 BPM | TEMPERATURE: 98 F | WEIGHT: 175 LBS | BODY MASS INDEX: 26.61 KG/M2 | SYSTOLIC BLOOD PRESSURE: 136 MMHG

## 2023-07-06 DIAGNOSIS — R10.9 ABDOMINAL PAIN: ICD-10-CM

## 2023-07-06 DIAGNOSIS — R10.11 RUQ PAIN: ICD-10-CM

## 2023-07-06 LAB
ALBUMIN SERPL BCP-MCNC: 3.9 G/DL (ref 3.5–5.2)
ALP SERPL-CCNC: 55 U/L (ref 55–135)
ALT SERPL W/O P-5'-P-CCNC: 13 U/L (ref 10–44)
ANION GAP SERPL CALC-SCNC: 10 MMOL/L (ref 8–16)
AST SERPL-CCNC: 26 U/L (ref 10–40)
BASOPHILS # BLD AUTO: 0.04 K/UL (ref 0–0.2)
BASOPHILS NFR BLD: 0.5 % (ref 0–1.9)
BILIRUB SERPL-MCNC: 0.3 MG/DL (ref 0.1–1)
BILIRUB UR QL STRIP: NEGATIVE
BNP SERPL-MCNC: <10 PG/ML (ref 0–99)
BUN SERPL-MCNC: 11 MG/DL (ref 6–20)
CALCIUM SERPL-MCNC: 9.4 MG/DL (ref 8.7–10.5)
CHLORIDE SERPL-SCNC: 99 MMOL/L (ref 95–110)
CLARITY UR: CLEAR
CO2 SERPL-SCNC: 22 MMOL/L (ref 23–29)
COLOR UR: YELLOW
CREAT SERPL-MCNC: 1 MG/DL (ref 0.5–1.4)
DIFFERENTIAL METHOD: ABNORMAL
EOSINOPHIL # BLD AUTO: 0.1 K/UL (ref 0–0.5)
EOSINOPHIL NFR BLD: 0.9 % (ref 0–8)
ERYTHROCYTE [DISTWIDTH] IN BLOOD BY AUTOMATED COUNT: 13.1 % (ref 11.5–14.5)
EST. GFR  (NO RACE VARIABLE): >60 ML/MIN/1.73 M^2
GLUCOSE SERPL-MCNC: 84 MG/DL (ref 70–110)
GLUCOSE UR QL STRIP: NEGATIVE
HCT VFR BLD AUTO: 40.4 % (ref 40–54)
HGB BLD-MCNC: 14 G/DL (ref 14–18)
HGB UR QL STRIP: NEGATIVE
IMM GRANULOCYTES # BLD AUTO: 0.02 K/UL (ref 0–0.04)
IMM GRANULOCYTES NFR BLD AUTO: 0.2 % (ref 0–0.5)
KETONES UR QL STRIP: NEGATIVE
LEUKOCYTE ESTERASE UR QL STRIP: NEGATIVE
LIPASE SERPL-CCNC: 17 U/L (ref 4–60)
LYMPHOCYTES # BLD AUTO: 3.4 K/UL (ref 1–4.8)
LYMPHOCYTES NFR BLD: 41.6 % (ref 18–48)
MCH RBC QN AUTO: 32.4 PG (ref 27–31)
MCHC RBC AUTO-ENTMCNC: 34.7 G/DL (ref 32–36)
MCV RBC AUTO: 94 FL (ref 82–98)
MONOCYTES # BLD AUTO: 0.8 K/UL (ref 0.3–1)
MONOCYTES NFR BLD: 10 % (ref 4–15)
NEUTROPHILS # BLD AUTO: 3.8 K/UL (ref 1.8–7.7)
NEUTROPHILS NFR BLD: 46.8 % (ref 38–73)
NITRITE UR QL STRIP: NEGATIVE
NRBC BLD-RTO: 0 /100 WBC
PH UR STRIP: 6 [PH] (ref 5–8)
PLATELET # BLD AUTO: 202 K/UL (ref 150–450)
PMV BLD AUTO: 9.3 FL (ref 9.2–12.9)
POTASSIUM SERPL-SCNC: 3.6 MMOL/L (ref 3.5–5.1)
PROT SERPL-MCNC: 8.1 G/DL (ref 6–8.4)
PROT UR QL STRIP: NEGATIVE
RBC # BLD AUTO: 4.32 M/UL (ref 4.6–6.2)
SODIUM SERPL-SCNC: 131 MMOL/L (ref 136–145)
SP GR UR STRIP: 1.01 (ref 1–1.03)
TROPONIN I SERPL DL<=0.01 NG/ML-MCNC: <0.006 NG/ML (ref 0–0.03)
URN SPEC COLLECT METH UR: NORMAL
UROBILINOGEN UR STRIP-ACNC: NEGATIVE EU/DL
WBC # BLD AUTO: 8.11 K/UL (ref 3.9–12.7)

## 2023-07-06 PROCEDURE — 83690 ASSAY OF LIPASE: CPT | Performed by: EMERGENCY MEDICINE

## 2023-07-06 PROCEDURE — 83880 ASSAY OF NATRIURETIC PEPTIDE: CPT | Performed by: EMERGENCY MEDICINE

## 2023-07-06 PROCEDURE — 84484 ASSAY OF TROPONIN QUANT: CPT | Performed by: EMERGENCY MEDICINE

## 2023-07-06 PROCEDURE — 93005 ELECTROCARDIOGRAM TRACING: CPT

## 2023-07-06 PROCEDURE — 93010 ELECTROCARDIOGRAM REPORT: CPT | Mod: ,,, | Performed by: INTERNAL MEDICINE

## 2023-07-06 PROCEDURE — 99285 EMERGENCY DEPT VISIT HI MDM: CPT | Mod: 25

## 2023-07-06 PROCEDURE — 85025 COMPLETE CBC W/AUTO DIFF WBC: CPT | Performed by: EMERGENCY MEDICINE

## 2023-07-06 PROCEDURE — 80053 COMPREHEN METABOLIC PANEL: CPT | Performed by: EMERGENCY MEDICINE

## 2023-07-06 PROCEDURE — 81003 URINALYSIS AUTO W/O SCOPE: CPT | Performed by: EMERGENCY MEDICINE

## 2023-07-06 PROCEDURE — 93010 EKG 12-LEAD: ICD-10-PCS | Mod: ,,, | Performed by: INTERNAL MEDICINE

## 2023-07-06 PROCEDURE — 25500020 PHARM REV CODE 255: Performed by: EMERGENCY MEDICINE

## 2023-07-06 RX ADMIN — IOHEXOL 75 ML: 350 INJECTION, SOLUTION INTRAVENOUS at 07:07

## 2023-07-06 NOTE — ED PROVIDER NOTES
Encounter Date: 7/6/2023       History     Chief Complaint   Patient presents with    Back Pain     Hx of herniated disc with left leg pain. Pt also complaining of right flank pain. Pt having difficulty sleeping due to pain. No urinary or bowel problems.     The patient is a 57-year-old male who came to the emergency department with right upper quadrant pain under his ribs for the past month.  He states he feels like his abdomen is getting larger.  The patient admits to drinking alcohol, a half a pt twice a week.  He also smokes cigarettes.  He denies any coughing or fever but states that he has shortness of breath and lower back pain.  He states the low back pain has been chronic.  He states he has pain to his lower back when he is moving around, especially when he tries to sit up from a lying position    Review of patient's allergies indicates:  No Known Allergies  Past Medical History:   Diagnosis Date    High cholesterol     Hypertension      Past Surgical History:   Procedure Laterality Date    CHOLECYSTECTOMY       History reviewed. No pertinent family history.  Social History     Tobacco Use    Smoking status: Every Day     Packs/day: 0.60     Years: 40.00     Pack years: 24.00     Types: Cigarettes     Start date: 1980    Smokeless tobacco: Never    Tobacco comments:     Pt declines referral to Ambulatory Smoking Cessation clinic.  Handout provided.   Substance Use Topics    Alcohol use: Yes     Comment: 8 quarts beer daily    Drug use: Yes     Types: Cocaine     Review of Systems   Constitutional:  Positive for activity change. Negative for appetite change and fever.   HENT:  Negative for sore throat.    Respiratory:  Negative for shortness of breath.    Cardiovascular:  Negative for chest pain.   Gastrointestinal:  Positive for abdominal distention and abdominal pain. Negative for diarrhea, nausea and vomiting.   Genitourinary:  Negative for dysuria.   Musculoskeletal:  Negative for back pain.   Skin:   Negative for rash.   Neurological:  Negative for weakness.   Hematological:  Does not bruise/bleed easily.     Physical Exam     Initial Vitals [07/06/23 1616]   BP Pulse Resp Temp SpO2   (!) 106/58 85 20 97.5 °F (36.4 °C) 98 %      MAP       --         Physical Exam    Nursing note and vitals reviewed.  Constitutional: He appears well-developed and well-nourished.   HENT:   Head: Normocephalic and atraumatic.   Eyes: EOM are normal. Pupils are equal, round, and reactive to light.   Neck: Neck supple.   Normal range of motion.  Cardiovascular:  Normal rate, regular rhythm, normal heart sounds and intact distal pulses.           Pulmonary/Chest: Breath sounds normal.   Abdominal: Abdomen is soft. Bowel sounds are normal. He exhibits no distension. There is abdominal tenderness (RUQ under the ribs). There is no rebound and no guarding.   Musculoskeletal:         General: No edema. Normal range of motion.      Cervical back: Normal range of motion and neck supple.     Neurological: He is alert and oriented to person, place, and time.   Skin: Skin is warm and dry.   Psychiatric: He has a normal mood and affect. His behavior is normal. Judgment and thought content normal.       ED Course   Procedures  Labs Reviewed   CBC W/ AUTO DIFFERENTIAL - Abnormal; Notable for the following components:       Result Value    RBC 4.32 (*)     MCH 32.4 (*)     All other components within normal limits   COMPREHENSIVE METABOLIC PANEL - Abnormal; Notable for the following components:    Sodium 131 (*)     CO2 22 (*)     All other components within normal limits   LIPASE   TROPONIN I   URINALYSIS, REFLEX TO URINE CULTURE    Narrative:     Specimen Source->Urine   B-TYPE NATRIURETIC PEPTIDE     EKG Readings: (Independently Interpreted)   Initial: 1804. Rhythm: Normal Sinus Rhythm. Heart Rate: 66. Ectopy: No Ectopy. Conduction: Normal. ST Segments: Normal ST Segments. T Waves: Normal. Clinical Impression: Normal Sinus Rhythm     Imaging  Results              CT Abdomen Pelvis With Contrast (Final result)  Result time 07/06/23 20:02:10      Final result by Casper Kwong MD (07/06/23 20:02:10)                   Impression:      Mild prominence of waste material consistent with mild constipation but no evidence of impaction or obstruction.    Normal appendix.    Atherosclerotic vascular disease in normal caliber aorta and three-vessel coronary distribution.    Multilevel spondylosis with remote compression deformity of L4.    No acute findings elsewhere in the abdomen or pelvis.      Electronically signed by: Casper Kwong  Date:    07/06/2023  Time:    20:02               Narrative:    EXAMINATION:  CT ABDOMEN PELVIS WITH CONTRAST    CLINICAL HISTORY:  Abdominal pain, acute, nonlocalized;    TECHNIQUE:  Low dose axial images, sagittal and coronal reformations were obtained from the lung bases to the pubic symphysis following the IV administration of 75 mL of Omnipaque 350 .  Oral contrast was not administered.    COMPARISON:  CT abdomen and pelvis, 08/30/2012, lumbar spine x-rays, 02/15/2023    FINDINGS:  Abdomen:    - Lower thorax:Base of the heart pericardium with atherosclerotic calcifications in coronary artery distribution.  No pericardial fluid.    - Lung bases: No infiltrates and no nodules.    - Liver: No focal mass.    - Gallbladder: Resected    - Bile Ducts: No evidence of intra or extra hepatic biliary ductal dilation.    - Spleen: Negative.    - Kidneys: No mass or hydronephrosis.    - Adrenals: Unremarkable.    - Pancreas: No mass or peripancreatic fat stranding.    - Retroperitoneum:  No significant adenopathy.    - Vascular: No abdominal aortic aneurysm.    - Abdominal wall:  Unremarkable.    Pelvis:    No pelvic mass, adenopathy, or free fluid.    Bowel/Mesentery:    No evidence of bowel obstruction or inflammation.  Prominent waste material without impaction.  Appendix appears normal.    Bones:  No acute osseous abnormality  and no suspicious lytic or blastic lesion.  Compression of L4 stable since prior plain film.  Thoracic and lumbar spondylosis.  No lytic changes.                                       X-Ray Chest AP Portable (Final result)  Result time 07/06/23 18:22:11      Final result by Casper Kwong MD (07/06/23 18:22:11)                   Impression:      No acute abnormality.      Electronically signed by: Casper Kwong  Date:    07/06/2023  Time:    18:22               Narrative:    EXAMINATION:  XR CHEST AP PORTABLE    CLINICAL HISTORY:  RUQ pain;    TECHNIQUE:  Single frontal view of the chest was performed.    COMPARISON:  Seven 13 2021    FINDINGS:  The lungs are clear, with normal appearance of pulmonary vasculature and no pleural effusion or pneumothorax.    The cardiac silhouette is normal in size. The hilar and mediastinal contours are unremarkable.    Bones are intact.                                       Medications   iohexoL (OMNIPAQUE 350) injection 75 mL (75 mLs Intravenous Given 7/6/23 1925)     Medical Decision Making:   Initial Assessment:   Old charts reviewed  Differential Diagnosis:   Cholecystitis, hepatitis, alcoholic cirrhosis, kidney stone, bowel obstruction  ED Management:  The patient is a 57-year-old male who came to the emergency department with right upper quadrant pain under his ribs for the past month.  He states he feels like his abdomen is getting larger.  The patient admits to drinking alcohol, a half a pt twice a week.  He also smokes cigarettes.     2155:  Chest x-ray no active disease.  CT abdomen pelvis shows moderate constipation, no other pathology.  The patient will be reassured at this point that his labs are normal and he needs to follow up with his primary care doctor, take a laxative to see if this improves his symptoms and he should quit smoking and drinking see if this improves his symptoms.  His pain is directly over his liver however his liver functions are within  normal limits, there is no pathology seen on the CT scan and he is status post cholecystectomy.  On CT scan, there is a large stool burden to the transverse colon.  I am sure this is a contributing factor to his pain.  He will be discharged at this time with recommendations and follow up with his primary care doctor           ED Course as of 07/06/23 2158   Thu Jul 06, 2023   2151 Lipase [ST]   2152 Brain natriuretic peptide [ST]   2152 CBC auto differential(!) [ST]   2152 Comprehensive metabolic panel(!) [ST]   2152 Troponin I [ST]   2152 Urinalysis, Reflex to Urine Culture Urine, Clean Catch [ST]   2152 CT Abdomen Pelvis With Contrast [ST]   2152 X-Ray Chest AP Portable [ST]      ED Course User Index  [ST] Laura Escamilla MD                 Clinical Impression:   Final diagnoses:  [R10.9] Abdominal pain - Right upper quadrant  [R10.11] RUQ pain        ED Disposition Condition    Discharge Stable          ED Prescriptions    None       Follow-up Information       Follow up With Specialties Details Why Contact Info    Aman Griggs MD Family Medicine Schedule an appointment as soon as possible for a visit  As needed [address]  [address]  [facility] [id number]  [phone]               Laura Escamilla MD  07/06/23 2158

## 2023-07-06 NOTE — ED NOTES
"Patient presents to the ED with c/o acute exacerbation of chronic lower back and left leg pain. Further c/o pain "behind right rib" x one month. States the pain is "itching." Denies rash. Patient denies fall/injury/trauma. Pt smells of alcohol. Endorses alcohol is the only thing to help the pain. Denies known GI history. Denies any difficulty or problems voiding. Denies n/v/d or constipation. Patient endorses hx of herniated discs. Patient placed on monitoring. Vitals stable. Awaiting orders. Nadn. Safety intact. Wctm.   "

## 2023-07-07 NOTE — DISCHARGE INSTRUCTIONS
STOP DRINKING ALCOHOL  STOP SMOKING CIGARETTES  TAKE A LAXATIVE OR STOOL SOFTENER AS YOUR MODERATELY CONSTIPATED

## 2023-09-27 ENCOUNTER — CLINICAL SUPPORT (OUTPATIENT)
Dept: SMOKING CESSATION | Facility: CLINIC | Age: 58
End: 2023-09-27

## 2023-09-27 DIAGNOSIS — F17.200 NICOTINE DEPENDENCE, UNCOMPLICATED: Primary | ICD-10-CM

## 2023-09-27 PROCEDURE — 99407 BEHAV CHNG SMOKING > 10 MIN: CPT | Mod: S$GLB,,, | Performed by: GENERAL PRACTICE

## 2023-09-27 PROCEDURE — 99407 PR TOBACCO USE CESSATION INTENSIVE >10 MINUTES: ICD-10-PCS | Mod: S$GLB,,, | Performed by: GENERAL PRACTICE

## 2023-09-27 NOTE — PROGRESS NOTES
Spoke with patient today in regard to smoking cessation progress for 12 month follow up. He states he is not tobacco free. Patient is not interested in the program. Informed patient of benefit period, future follow ups and contact information if any further help is needed. Will complete/ resolve smart form for 12 month follow up for Quit # 1.

## 2023-12-16 ENCOUNTER — HOSPITAL ENCOUNTER (EMERGENCY)
Facility: HOSPITAL | Age: 58
Discharge: HOME | End: 2023-12-16
Attending: EMERGENCY MEDICINE
Payer: MEDICAID

## 2023-12-16 VITALS
RESPIRATION RATE: 22 BRPM | DIASTOLIC BLOOD PRESSURE: 60 MMHG | OXYGEN SATURATION: 95 % | WEIGHT: 165 LBS | SYSTOLIC BLOOD PRESSURE: 102 MMHG | BODY MASS INDEX: 25.09 KG/M2 | TEMPERATURE: 98 F | HEART RATE: 85 BPM

## 2023-12-16 DIAGNOSIS — J11.1 INFLUENZA: Primary | ICD-10-CM

## 2023-12-16 LAB
INFLUENZA A, MOLECULAR: POSITIVE
INFLUENZA B, MOLECULAR: NEGATIVE
POCT GLUCOSE: 102 MG/DL (ref 70–110)
SARS-COV-2 RDRP RESP QL NAA+PROBE: NEGATIVE
SPECIMEN SOURCE: ABNORMAL

## 2023-12-16 PROCEDURE — 82962 GLUCOSE BLOOD TEST: CPT

## 2023-12-16 PROCEDURE — 99283 EMERGENCY DEPT VISIT LOW MDM: CPT

## 2023-12-16 PROCEDURE — U0002 COVID-19 LAB TEST NON-CDC: HCPCS

## 2023-12-16 PROCEDURE — 87502 INFLUENZA DNA AMP PROBE: CPT

## 2023-12-16 RX ORDER — BENZONATATE 100 MG/1
100 CAPSULE ORAL 3 TIMES DAILY PRN
Qty: 20 CAPSULE | Refills: 0 | Status: SHIPPED | OUTPATIENT
Start: 2023-12-16 | End: 2023-12-26

## 2023-12-16 NOTE — DISCHARGE INSTRUCTIONS
Your visit in the emergency room today determined that you have a viral illness. This may take around 7 days to pass, but can be managed with over the counter medications. Please see some of my recommendations below:     If not allergic, please take over the counter Tylenol (Acetaminophen) and/or Motrin (Ibuprofen) as directed for control of pain (body aches) and/or fever. You can stagger the dosing so you are taking one or the other every three hours while spacing out the Tylenol and every 6 hours and the Motrin every 6 hours.    You may take an over the counter antihistamine medication (Allegra/Claritin/Zyrtec) as directed for nasal congestion/runny nose. You may also try a decongestant such as Mucinex D or Sudafed (found behind the pharmacy counter). Flonase is also an option that you may use- one spray each nostril twice daily OR two sprays each nostril once daily.    If you have a cough with mucus production, try an Mucinex or Robitussin. If you have a dry cough, Delsym may work better.      Sore throat recommendations: Warm fluids, warm salt water gargles, throat lozenges, tea, honey, soup, rest, hydration.     Please return or see your primary care doctor if you develop new or worsening symptoms.     Thank you for allowing me and my emergency team to take care of you here today! I hope you feel better soon. Please do not hesitate to return with any additional concerns that may arise from this or any new problem you encounter.    Our goal in the emergency department is to always give you outstanding care and exceptional service. If you receive a survey by mail or e-mail in the next week regarding your experience in our ED, we would greatly appreciate you completing it. Your feedback provides us with a way to recognize our staff who give very good care and it helps us learn how to improve when your experience was below the excellence we aspire to be!    Brook Juneau, PA-C [facility] [place], and   [place]   Emergency Room Physician Assistant

## 2023-12-16 NOTE — ED NOTES
Psych: No acute distress is noted. Pt is calm and cooperative, good eye contact.    HEENT: c/o HA, no dizziness, patient slightly confused, slow to answer. Patient states he has been falling a little lately    APPEARANCE: Patient is clean and well groomed    SKIN: The skin is warm, dry and intact. Patient has normal skin turgor and moist mucus membranes, no rashes or lesions. No Breakdown noted. No abrasions noted.     MUSCULOSKELETAL:  Normal range of motion noted. Moves all extremities well, No swelling, deformity or tenderness noted. FROM    RESPIRATORY: Airway is open and patent, respirations are spontaneous; patient has a normal effort and rate. Pink nailbeds.     GI/ : Soft and non tender to palpation, no distention noted. No nausea, no vomiting    PULSES: 2+  And symmetrical in all extremities

## 2024-01-31 ENCOUNTER — HOSPITAL ENCOUNTER (OUTPATIENT)
Dept: RADIOLOGY | Facility: HOSPITAL | Age: 59
Discharge: HOME | End: 2024-01-31
Attending: INTERNAL MEDICINE
Payer: MEDICAID

## 2024-01-31 DIAGNOSIS — S32.040S WEDGE COMPRESSION FRACTURE OF FOURTH LUMBAR VERTEBRA, SEQUELA: ICD-10-CM

## 2024-01-31 PROCEDURE — 72148 MRI LUMBAR SPINE W/O DYE: CPT | Mod: TC

## 2024-01-31 PROCEDURE — 72148 MRI LUMBAR SPINE W/O DYE: CPT | Mod: 26,,, | Performed by: INTERNAL MEDICINE

## 2024-11-07 DIAGNOSIS — F17.210 CIGARETTE NICOTINE DEPENDENCE, UNCOMPLICATED: Primary | ICD-10-CM

## 2025-01-01 ENCOUNTER — HOSPITAL ENCOUNTER (OUTPATIENT)
Facility: HOSPITAL | Age: 60
LOS: 1 days | End: 2025-06-28
Attending: EMERGENCY MEDICINE | Admitting: FAMILY MEDICINE
Payer: MEDICAID

## 2025-01-01 VITALS
HEART RATE: 107 BPM | WEIGHT: 116.88 LBS | RESPIRATION RATE: 36 BRPM | OXYGEN SATURATION: 78 % | DIASTOLIC BLOOD PRESSURE: 36 MMHG | SYSTOLIC BLOOD PRESSURE: 61 MMHG

## 2025-01-01 DIAGNOSIS — Z85.048 HISTORY OF COLORECTAL CANCER: ICD-10-CM

## 2025-01-01 DIAGNOSIS — E86.0 DEHYDRATION: ICD-10-CM

## 2025-01-01 DIAGNOSIS — A41.9 SEPSIS, DUE TO UNSPECIFIED ORGANISM, UNSPECIFIED WHETHER ACUTE ORGAN DYSFUNCTION PRESENT: Primary | ICD-10-CM

## 2025-01-01 LAB
ABSOLUTE NEUTROPHIL MANUAL (OHS): 9.7 K/UL
ALBUMIN SERPL BCP-MCNC: 1.3 G/DL (ref 3.5–5.2)
ALLENS TEST: YES
ALP SERPL-CCNC: 736 UNIT/L (ref 40–150)
ALT SERPL W/O P-5'-P-CCNC: 84 UNIT/L (ref 10–44)
ANION GAP (OHS): 13 MMOL/L (ref 8–16)
ANISOCYTOSIS BLD QL SMEAR: SLIGHT
AST SERPL-CCNC: 321 UNIT/L (ref 11–45)
BACTERIA #/AREA URNS AUTO: ABNORMAL /HPF
BILIRUB SERPL-MCNC: 2.3 MG/DL (ref 0.1–1)
BILIRUB UR QL STRIP.AUTO: ABNORMAL
BNP SERPL-MCNC: 17 PG/ML (ref 0–99)
BUN SERPL-MCNC: 35 MG/DL (ref 6–20)
CALCIUM SERPL-MCNC: 7.3 MG/DL (ref 8.7–10.5)
CHLORIDE SERPL-SCNC: 120 MMOL/L (ref 95–110)
CLARITY UR: ABNORMAL
CO2 SERPL-SCNC: 18 MMOL/L (ref 23–29)
COLOR UR AUTO: YELLOW
CREAT SERPL-MCNC: 1.6 MG/DL (ref 0.5–1.4)
ERYTHROCYTE [DISTWIDTH] IN BLOOD BY AUTOMATED COUNT: 20.7 % (ref 11.5–14.5)
FIO2: 100 %
GFR SERPLBLD CREATININE-BSD FMLA CKD-EPI: 49 ML/MIN/1.73/M2
GLUCOSE SERPL-MCNC: 92 MG/DL (ref 70–110)
GLUCOSE UR QL STRIP: NEGATIVE
HCT VFR BLD AUTO: 30.2 % (ref 40–54)
HCT VFR BLD CALC: 31 % (ref 36–54)
HGB BLD-MCNC: 10.1 G/DL (ref 9–18)
HGB BLD-MCNC: 9.7 GM/DL (ref 14–18)
HGB UR QL STRIP: ABNORMAL
HYALINE CASTS UR QL AUTO: 0 /LPF (ref 0–1)
HYPOCHROMIA BLD QL SMEAR: ABNORMAL
KETONES UR QL STRIP: NEGATIVE
LDH SERPL L TO P-CCNC: 6.3 MMOL/L (ref 0.5–2.2)
LEUKOCYTE ESTERASE UR QL STRIP: ABNORMAL
LIPASE SERPL-CCNC: 27 U/L (ref 4–60)
LYMPHOCYTES NFR BLD MANUAL: 9 % (ref 18–48)
MAGNESIUM SERPL-MCNC: 2.3 MG/DL (ref 1.6–2.6)
MCH RBC QN AUTO: 31.2 PG (ref 27–31)
MCHC RBC AUTO-ENTMCNC: 32.1 G/DL (ref 32–36)
MCV RBC AUTO: 97 FL (ref 82–98)
MICROSCOPIC COMMENT: ABNORMAL
MYELOCYTES NFR BLD MANUAL: 2 %
NEUTROPHILS NFR BLD MANUAL: 88 % (ref 38–73)
NEUTS BAND NFR BLD MANUAL: 1 %
NITRITE UR QL STRIP: NEGATIVE
NUCLEATED RBC (/100WBC) (OHS): 3 /100 WBC
PCO2 BLDA: 36.3 MMHG (ref 35–45)
PEEP: 8
PH SMN: 7.35 [PH] (ref 7.35–7.45)
PH UR STRIP: 6 [PH]
PLATELET # BLD AUTO: 81 K/UL (ref 150–450)
PLATELET BLD QL SMEAR: ABNORMAL
PMV BLD AUTO: 10.3 FL (ref 9.2–12.9)
PO2 BLDA: 40.7 MMHG (ref 40–60)
POC BASE DEFICIT: -5.1 MMOL/L (ref -2–2)
POC HCO3: 20 MMOL/L (ref 24–28)
POC IONIZED CALCIUM: 1.05 MMOL/L (ref 1.06–1.42)
POC PERFORMED BY: ABNORMAL
POC SATURATED O2: 66.5 % (ref 95–100)
POC SET RR: 14
POTASSIUM BLD-SCNC: 3.8 MMOL/L (ref 3.5–5.1)
POTASSIUM SERPL-SCNC: 3.9 MMOL/L (ref 3.5–5.1)
PROCALCITONIN SERPL-MCNC: 9.32 NG/ML
PROT SERPL-MCNC: 5.9 GM/DL (ref 6–8.4)
PROT UR QL STRIP: ABNORMAL
RBC # BLD AUTO: 3.11 M/UL (ref 4.6–6.2)
RBC #/AREA URNS AUTO: 9 /HPF (ref 0–4)
SODIUM BLD-SCNC: 153 MMOL/L (ref 136–145)
SODIUM SERPL-SCNC: 151 MMOL/L (ref 136–145)
SP GR UR STRIP: 1.02
SPECIMEN SOURCE: ABNORMAL
SQUAMOUS #/AREA URNS AUTO: 0 /HPF
TROPONIN I SERPL DL<=0.01 NG/ML-MCNC: 0.22 NG/ML
UROBILINOGEN UR STRIP-ACNC: >=8 EU/DL
WBC # BLD AUTO: 10.95 K/UL (ref 3.9–12.7)
WBC #/AREA URNS AUTO: >100 /HPF (ref 0–5)
YEAST UR QL AUTO: ABNORMAL /HPF

## 2025-01-01 PROCEDURE — 96374 THER/PROPH/DIAG INJ IV PUSH: CPT | Mod: 59

## 2025-01-01 PROCEDURE — 83605 ASSAY OF LACTIC ACID: CPT

## 2025-01-01 PROCEDURE — 96365 THER/PROPH/DIAG IV INF INIT: CPT

## 2025-01-01 PROCEDURE — G0378 HOSPITAL OBSERVATION PER HR: HCPCS

## 2025-01-01 PROCEDURE — 83735 ASSAY OF MAGNESIUM: CPT | Performed by: EMERGENCY MEDICINE

## 2025-01-01 PROCEDURE — 83690 ASSAY OF LIPASE: CPT | Performed by: EMERGENCY MEDICINE

## 2025-01-01 PROCEDURE — 94761 N-INVAS EAR/PLS OXIMETRY MLT: CPT | Mod: XB

## 2025-01-01 PROCEDURE — 81001 URINALYSIS AUTO W/SCOPE: CPT | Performed by: EMERGENCY MEDICINE

## 2025-01-01 PROCEDURE — 27100171 HC OXYGEN HIGH FLOW UP TO 24 HOURS

## 2025-01-01 PROCEDURE — 87186 SC STD MICRODIL/AGAR DIL: CPT | Performed by: EMERGENCY MEDICINE

## 2025-01-01 PROCEDURE — 99900035 HC TECH TIME PER 15 MIN (STAT)

## 2025-01-01 PROCEDURE — 82803 BLOOD GASES ANY COMBINATION: CPT

## 2025-01-01 PROCEDURE — 96367 TX/PROPH/DG ADDL SEQ IV INF: CPT

## 2025-01-01 PROCEDURE — 63600175 PHARM REV CODE 636 W HCPCS: Performed by: FAMILY MEDICINE

## 2025-01-01 PROCEDURE — 99291 CRITICAL CARE FIRST HOUR: CPT

## 2025-01-01 PROCEDURE — 84132 ASSAY OF SERUM POTASSIUM: CPT

## 2025-01-01 PROCEDURE — 25000003 PHARM REV CODE 250: Performed by: EMERGENCY MEDICINE

## 2025-01-01 PROCEDURE — 83880 ASSAY OF NATRIURETIC PEPTIDE: CPT | Performed by: EMERGENCY MEDICINE

## 2025-01-01 PROCEDURE — 85007 BL SMEAR W/DIFF WBC COUNT: CPT | Performed by: EMERGENCY MEDICINE

## 2025-01-01 PROCEDURE — 84484 ASSAY OF TROPONIN QUANT: CPT | Performed by: EMERGENCY MEDICINE

## 2025-01-01 PROCEDURE — 84295 ASSAY OF SERUM SODIUM: CPT

## 2025-01-01 PROCEDURE — 94660 CPAP INITIATION&MGMT: CPT

## 2025-01-01 PROCEDURE — 27000190 HC CPAP FULL FACE MASK W/VALVE

## 2025-01-01 PROCEDURE — 84145 PROCALCITONIN (PCT): CPT | Performed by: EMERGENCY MEDICINE

## 2025-01-01 PROCEDURE — 80053 COMPREHEN METABOLIC PANEL: CPT | Performed by: EMERGENCY MEDICINE

## 2025-01-01 PROCEDURE — 82330 ASSAY OF CALCIUM: CPT

## 2025-01-01 PROCEDURE — 85014 HEMATOCRIT: CPT

## 2025-01-01 PROCEDURE — 63600175 PHARM REV CODE 636 W HCPCS: Performed by: EMERGENCY MEDICINE

## 2025-01-01 PROCEDURE — 96375 TX/PRO/DX INJ NEW DRUG ADDON: CPT

## 2025-01-01 RX ORDER — CEFTRIAXONE 1 G/1
1 INJECTION, POWDER, FOR SOLUTION INTRAMUSCULAR; INTRAVENOUS
Status: DISCONTINUED | OUTPATIENT
Start: 2025-01-01 | End: 2025-01-01

## 2025-01-01 RX ORDER — MORPHINE SULFATE 2 MG/ML
2 INJECTION, SOLUTION INTRAMUSCULAR; INTRAVENOUS
Status: DISCONTINUED | OUTPATIENT
Start: 2025-01-01 | End: 2025-06-29 | Stop reason: HOSPADM

## 2025-01-01 RX ORDER — SODIUM CHLORIDE 0.9 % (FLUSH) 0.9 %
10 SYRINGE (ML) INJECTION
Status: DISCONTINUED | OUTPATIENT
Start: 2025-01-01 | End: 2025-06-29 | Stop reason: HOSPADM

## 2025-01-01 RX ORDER — NALOXONE HCL 0.4 MG/ML
1 VIAL (ML) INJECTION
Status: COMPLETED | OUTPATIENT
Start: 2025-01-01 | End: 2025-01-01

## 2025-01-01 RX ORDER — FENTANYL CITRATE 50 UG/ML
100 INJECTION, SOLUTION INTRAMUSCULAR; INTRAVENOUS
Refills: 0 | Status: COMPLETED | OUTPATIENT
Start: 2025-01-01 | End: 2025-01-01

## 2025-01-01 RX ORDER — LORAZEPAM 2 MG/ML
2 INJECTION INTRAMUSCULAR
Status: DISCONTINUED | OUTPATIENT
Start: 2025-01-01 | End: 2025-06-29 | Stop reason: HOSPADM

## 2025-01-01 RX ADMIN — VANCOMYCIN HYDROCHLORIDE 1000 MG: 1 INJECTION, POWDER, LYOPHILIZED, FOR SOLUTION INTRAVENOUS at 11:06

## 2025-01-01 RX ADMIN — LORAZEPAM 2 MG: 2 INJECTION INTRAMUSCULAR; INTRAVENOUS at 02:06

## 2025-01-01 RX ADMIN — FENTANYL CITRATE 100 MCG: 50 INJECTION INTRAMUSCULAR; INTRAVENOUS at 11:06

## 2025-01-01 RX ADMIN — MORPHINE SULFATE 2 MG: 2 INJECTION, SOLUTION INTRAMUSCULAR; INTRAVENOUS at 02:06

## 2025-01-01 RX ADMIN — PIPERACILLIN SODIUM AND TAZOBACTAM SODIUM 4.5 G: 4; .5 INJECTION, POWDER, LYOPHILIZED, FOR SOLUTION INTRAVENOUS at 10:06

## 2025-01-01 RX ADMIN — NALOXONE HYDROCHLORIDE 1 MG: 0.4 INJECTION, SOLUTION INTRAMUSCULAR; INTRAVENOUS; SUBCUTANEOUS at 10:06

## 2025-01-01 RX ADMIN — SODIUM CHLORIDE 1590 ML: 9 INJECTION, SOLUTION INTRAVENOUS at 10:06

## 2025-02-24 ENCOUNTER — HOSPITAL ENCOUNTER (EMERGENCY)
Facility: HOSPITAL | Age: 60
Discharge: ANOTHER HOSPITAL | End: 2025-02-24
Attending: EMERGENCY MEDICINE
Payer: MEDICAID

## 2025-02-24 ENCOUNTER — HOSPITAL ENCOUNTER (EMERGENCY)
Facility: HOSPITAL | Age: 60
Discharge: HOME | End: 2025-02-25
Attending: STUDENT IN AN ORGANIZED HEALTH CARE EDUCATION/TRAINING PROGRAM
Payer: MEDICAID

## 2025-02-24 VITALS
SYSTOLIC BLOOD PRESSURE: 130 MMHG | RESPIRATION RATE: 20 BRPM | HEART RATE: 85 BPM | WEIGHT: 160.06 LBS | HEIGHT: 65 IN | TEMPERATURE: 97 F | OXYGEN SATURATION: 98 % | DIASTOLIC BLOOD PRESSURE: 82 MMHG | BODY MASS INDEX: 26.67 KG/M2

## 2025-02-24 DIAGNOSIS — R26.89 BALANCE PROBLEM: ICD-10-CM

## 2025-02-24 DIAGNOSIS — S06.5XAA SUBDURAL HEMATOMA: Primary | ICD-10-CM

## 2025-02-24 DIAGNOSIS — R51.9 HEADACHE: ICD-10-CM

## 2025-02-24 DIAGNOSIS — S09.90XA HEAD INJURY: ICD-10-CM

## 2025-02-24 LAB
ALBUMIN SERPL BCP-MCNC: 3.5 G/DL (ref 3.5–5.2)
ALBUMIN SERPL BCP-MCNC: 4.3 G/DL (ref 3.5–5.2)
ALP SERPL-CCNC: 133 U/L (ref 40–150)
ALP SERPL-CCNC: 156 U/L (ref 40–150)
ALT SERPL W/O P-5'-P-CCNC: 17 U/L (ref 10–44)
ALT SERPL W/O P-5'-P-CCNC: 18 U/L (ref 10–44)
ANION GAP SERPL CALC-SCNC: 12 MMOL/L (ref 8–16)
ANION GAP SERPL CALC-SCNC: 15 MMOL/L (ref 8–16)
AST SERPL-CCNC: 37 U/L (ref 10–40)
AST SERPL-CCNC: 37 U/L (ref 10–40)
BASOPHILS # BLD AUTO: 0.04 K/UL (ref 0–0.2)
BASOPHILS # BLD AUTO: 0.05 K/UL (ref 0–0.2)
BASOPHILS NFR BLD: 0.4 % (ref 0–1.9)
BASOPHILS NFR BLD: 0.4 % (ref 0–1.9)
BILIRUB SERPL-MCNC: 0.6 MG/DL (ref 0.1–1)
BILIRUB SERPL-MCNC: 0.7 MG/DL (ref 0.1–1)
BUN SERPL-MCNC: 11 MG/DL (ref 6–20)
BUN SERPL-MCNC: 11 MG/DL (ref 6–20)
CALCIUM SERPL-MCNC: 10.1 MG/DL (ref 8.7–10.5)
CALCIUM SERPL-MCNC: 9.4 MG/DL (ref 8.7–10.5)
CHLORIDE SERPL-SCNC: 94 MMOL/L (ref 95–110)
CHLORIDE SERPL-SCNC: 97 MMOL/L (ref 95–110)
CO2 SERPL-SCNC: 22 MMOL/L (ref 23–29)
CO2 SERPL-SCNC: 23 MMOL/L (ref 23–29)
CREAT SERPL-MCNC: 0.8 MG/DL (ref 0.5–1.4)
CREAT SERPL-MCNC: 0.9 MG/DL (ref 0.5–1.4)
DIFFERENTIAL METHOD BLD: ABNORMAL
DIFFERENTIAL METHOD BLD: ABNORMAL
EOSINOPHIL # BLD AUTO: 0 K/UL (ref 0–0.5)
EOSINOPHIL # BLD AUTO: 0 K/UL (ref 0–0.5)
EOSINOPHIL NFR BLD: 0.2 % (ref 0–8)
EOSINOPHIL NFR BLD: 0.3 % (ref 0–8)
ERYTHROCYTE [DISTWIDTH] IN BLOOD BY AUTOMATED COUNT: 12.7 % (ref 11.5–14.5)
ERYTHROCYTE [DISTWIDTH] IN BLOOD BY AUTOMATED COUNT: 12.9 % (ref 11.5–14.5)
EST. GFR  (NO RACE VARIABLE): >60 ML/MIN/1.73 M^2
EST. GFR  (NO RACE VARIABLE): >60 ML/MIN/1.73 M^2
GLUCOSE SERPL-MCNC: 102 MG/DL (ref 70–110)
GLUCOSE SERPL-MCNC: 93 MG/DL (ref 70–110)
HCT VFR BLD AUTO: 40.5 % (ref 40–54)
HCT VFR BLD AUTO: 45.5 % (ref 40–54)
HGB BLD-MCNC: 13.6 G/DL (ref 14–18)
HGB BLD-MCNC: 15.6 G/DL (ref 14–18)
IMM GRANULOCYTES # BLD AUTO: 0.04 K/UL (ref 0–0.04)
IMM GRANULOCYTES # BLD AUTO: 0.04 K/UL (ref 0–0.04)
IMM GRANULOCYTES NFR BLD AUTO: 0.4 % (ref 0–0.5)
IMM GRANULOCYTES NFR BLD AUTO: 0.4 % (ref 0–0.5)
INR PPP: 1 (ref 0.8–1.2)
LYMPHOCYTES # BLD AUTO: 2.7 K/UL (ref 1–4.8)
LYMPHOCYTES # BLD AUTO: 2.7 K/UL (ref 1–4.8)
LYMPHOCYTES NFR BLD: 24 % (ref 18–48)
LYMPHOCYTES NFR BLD: 24.8 % (ref 18–48)
MCH RBC QN AUTO: 30.7 PG (ref 27–31)
MCH RBC QN AUTO: 31.1 PG (ref 27–31)
MCHC RBC AUTO-ENTMCNC: 33.6 G/DL (ref 32–36)
MCHC RBC AUTO-ENTMCNC: 34.3 G/DL (ref 32–36)
MCV RBC AUTO: 91 FL (ref 82–98)
MCV RBC AUTO: 91 FL (ref 82–98)
MONOCYTES # BLD AUTO: 0.9 K/UL (ref 0.3–1)
MONOCYTES # BLD AUTO: 1 K/UL (ref 0.3–1)
MONOCYTES NFR BLD: 8.2 % (ref 4–15)
MONOCYTES NFR BLD: 9.2 % (ref 4–15)
NEUTROPHILS # BLD AUTO: 7.2 K/UL (ref 1.8–7.7)
NEUTROPHILS # BLD AUTO: 7.6 K/UL (ref 1.8–7.7)
NEUTROPHILS NFR BLD: 65 % (ref 38–73)
NEUTROPHILS NFR BLD: 66.7 % (ref 38–73)
NRBC BLD-RTO: 0 /100 WBC
NRBC BLD-RTO: 0 /100 WBC
PLATELET # BLD AUTO: 245 K/UL (ref 150–450)
PLATELET # BLD AUTO: 252 K/UL (ref 150–450)
PMV BLD AUTO: 8.9 FL (ref 9.2–12.9)
PMV BLD AUTO: 9.3 FL (ref 9.2–12.9)
POTASSIUM SERPL-SCNC: 3.5 MMOL/L (ref 3.5–5.1)
POTASSIUM SERPL-SCNC: 3.6 MMOL/L (ref 3.5–5.1)
PROT SERPL-MCNC: 10.4 G/DL (ref 6–8.4)
PROT SERPL-MCNC: 8.4 G/DL (ref 6–8.4)
PROTHROMBIN TIME: 11.6 SEC (ref 9–12.5)
RBC # BLD AUTO: 4.43 M/UL (ref 4.6–6.2)
RBC # BLD AUTO: 5.02 M/UL (ref 4.6–6.2)
SODIUM SERPL-SCNC: 131 MMOL/L (ref 136–145)
SODIUM SERPL-SCNC: 132 MMOL/L (ref 136–145)
WBC # BLD AUTO: 11.03 K/UL (ref 3.9–12.7)
WBC # BLD AUTO: 11.38 K/UL (ref 3.9–12.7)

## 2025-02-24 PROCEDURE — 96375 TX/PRO/DX INJ NEW DRUG ADDON: CPT

## 2025-02-24 PROCEDURE — 96361 HYDRATE IV INFUSION ADD-ON: CPT

## 2025-02-24 PROCEDURE — 80053 COMPREHEN METABOLIC PANEL: CPT | Performed by: EMERGENCY MEDICINE

## 2025-02-24 PROCEDURE — 93010 ELECTROCARDIOGRAM REPORT: CPT | Mod: ,,, | Performed by: INTERNAL MEDICINE

## 2025-02-24 PROCEDURE — 85025 COMPLETE CBC W/AUTO DIFF WBC: CPT | Performed by: EMERGENCY MEDICINE

## 2025-02-24 PROCEDURE — 80053 COMPREHEN METABOLIC PANEL: CPT | Mod: 91

## 2025-02-24 PROCEDURE — 63600175 PHARM REV CODE 636 W HCPCS

## 2025-02-24 PROCEDURE — 85025 COMPLETE CBC W/AUTO DIFF WBC: CPT | Mod: 91

## 2025-02-24 PROCEDURE — 96374 THER/PROPH/DIAG INJ IV PUSH: CPT

## 2025-02-24 PROCEDURE — 85610 PROTHROMBIN TIME: CPT | Performed by: EMERGENCY MEDICINE

## 2025-02-24 PROCEDURE — 99285 EMERGENCY DEPT VISIT HI MDM: CPT | Mod: 25

## 2025-02-24 PROCEDURE — 25000003 PHARM REV CODE 250

## 2025-02-24 PROCEDURE — 93005 ELECTROCARDIOGRAM TRACING: CPT

## 2025-02-24 PROCEDURE — 94761 N-INVAS EAR/PLS OXIMETRY MLT: CPT

## 2025-02-24 PROCEDURE — 99284 EMERGENCY DEPT VISIT MOD MDM: CPT | Mod: 25,27

## 2025-02-24 RX ORDER — ACETAMINOPHEN 500 MG
1000 TABLET ORAL
Status: COMPLETED | OUTPATIENT
Start: 2025-02-24 | End: 2025-02-24

## 2025-02-24 RX ORDER — PROCHLORPERAZINE EDISYLATE 5 MG/ML
10 INJECTION INTRAMUSCULAR; INTRAVENOUS
Status: COMPLETED | OUTPATIENT
Start: 2025-02-24 | End: 2025-02-24

## 2025-02-24 RX ORDER — DIPHENHYDRAMINE HYDROCHLORIDE 50 MG/ML
12.5 INJECTION INTRAMUSCULAR; INTRAVENOUS
Status: COMPLETED | OUTPATIENT
Start: 2025-02-24 | End: 2025-02-24

## 2025-02-24 RX ORDER — MORPHINE SULFATE 4 MG/ML
4 INJECTION, SOLUTION INTRAMUSCULAR; INTRAVENOUS
Refills: 0 | Status: COMPLETED | OUTPATIENT
Start: 2025-02-24 | End: 2025-02-24

## 2025-02-24 RX ADMIN — ACETAMINOPHEN 1000 MG: 500 TABLET ORAL at 03:02

## 2025-02-24 RX ADMIN — PROCHLORPERAZINE EDISYLATE 10 MG: 5 INJECTION INTRAMUSCULAR; INTRAVENOUS at 03:02

## 2025-02-24 RX ADMIN — MORPHINE SULFATE 4 MG: 4 INJECTION INTRAVENOUS at 07:02

## 2025-02-24 RX ADMIN — SODIUM CHLORIDE 1000 ML: 9 INJECTION, SOLUTION INTRAVENOUS at 09:02

## 2025-02-24 RX ADMIN — DIPHENHYDRAMINE HYDROCHLORIDE 12.5 MG: 50 INJECTION, SOLUTION INTRAMUSCULAR; INTRAVENOUS at 03:02

## 2025-02-24 NOTE — ASSESSMENT & PLAN NOTE
Dell Sewell III is a 59 y.o. male with past medical history of HTN and HLD who presents to [facility] as transfer from [place] for neurosurgical evaluation small right parietal convexity SDH. Pt reports fall approx 2 weeks ago and hit his head on the arm of his couch. Pt has experienced headache, weakness, and intermittent dizziness since that time. Denies numbness, tingling, blurry vision, N/V, saddle anesthesia, bowel/bladder changes, or other focal neurological deficits. Denies AP/ AC use.     Imaging:  CTH 2/24: Right parietal convexity small acute subdural hemorrhage      Plan:  Patient seen by NSGY at bedside  All labs and diagnostics reviewed  Follow up repeat CTH 6h stability scan.  hold anti-plt/coag medications   No acute surgical intervention at this time  If repeat scan is stable, pt ok to discharge home from NSGY perspective  Continue to monitor clinically, notify NSGY immediately with any changes in neuro status      Plan discussed with Dr. Mercer     Dispo: pending imaging

## 2025-02-24 NOTE — HPI
Dell Sewell III is a 59 y.o. male with past medical history of HTN and HLD who presents to [facility] as transfer from [place] for neurosurgical evaluation small right parietal convexity SDH. Pt reports fall approx 2 weeks ago and hit his head on the arm of his couch. Pt has experienced headache, weakness, and intermittent dizziness since that time. Denies numbness, tingling, blurry vision, N/V, saddle anesthesia, bowel/bladder changes, or other focal neurological deficits. Denies AP/ AC use.

## 2025-02-24 NOTE — CONSULTS
[facility] - Emergency Dept  Neurosurgery  Consult Note    Inpatient consult to Neurosurgery  Consult performed by: Cuco West MD  Consult ordered by: Chay Chao Jr., MD        Subjective:     Chief Complaint/Reason for Admission: small right parietal convexity SDH    History of Present Illness: Dell Sewell III is a 59 y.o. male with past medical history of HTN and HLD who presents to [facility] as transfer from [place] for neurosurgical evaluation small right parietal convexity SDH. Pt reports fall approx 2 weeks ago and hit his head on the arm of his couch. Pt has experienced headache, weakness, and intermittent dizziness since that time. Denies numbness, tingling, blurry vision, N/V, saddle anesthesia, bowel/bladder changes, or other focal neurological deficits. Denies AP/ AC use.     Prescriptions Prior to Admission[1]    Review of patient's allergies indicates:  No Known Allergies    Past Medical History:   Diagnosis Date    High cholesterol     Hypertension      Past Surgical History:   Procedure Laterality Date    CHOLECYSTECTOMY       Family History    None       Tobacco Use    Smoking status: Every Day     Current packs/day: 0.60     Average packs/day: 0.6 packs/day for 45.1 years (27.1 ttl pk-yrs)     Types: Cigarettes     Start date: 1980    Smokeless tobacco: Never    Tobacco comments:     Pt declines referral to Ambulatory Smoking Cessation clinic.  Handout provided.   Substance and Sexual Activity    Alcohol use: Yes     Comment: 8 quarts beer daily    Drug use: Yes     Types: Cocaine    Sexual activity: Not on file     Review of Systems  Objective:     Weight: 72.6 kg (160 lb 0.9 oz)  Body mass index is 26.63 kg/m².  Vital Signs (Most Recent):  Temp: 98.3 °F (36.8 °C) (02/24/25 1716)  Pulse: 77 (02/24/25 1716)  Resp: 16 (02/24/25 1716)  BP: (!) 145/87 (02/24/25 1518)  SpO2: 98 % (02/24/25 1716) Vital Signs (24h Range):  Temp:  [97.4 °F (36.3 °C)-98.4 °F (36.9 °C)] 98.3 °F (36.8 °C)  Pulse:   [76-98] 77  Resp:  [16-21] 16  SpO2:  [98 %] 98 %  BP: (126-145)/(72-87) 145/87                        Physical Exam     Neurosurgery Physical Exam  Neuro Exam  General: AOx3, GCS [id number]   CNII-XII: Intact on exam, PERRL, visual fields grossly intact, EOMi, facial sensation preserved, no facial asymmetry, tongue/uvula/palate midline, shoulder shrug equal, no pronator drift   Extremities: grossly full strength throughout, sensorium intact throughout, coordination intact throughout, DTRs 2+, no pathological reflexes, no sensory level present     General: Awake, Alert, Oriented   Head: normocephalic, atraumatic   Eyes: Pupils equal, EOMi   Neck: Supple, normal ROM, no tenderness to palpation   CVS: Normal rate and rhythm, distal pulses present   Pulm: Symmetric expansion, no respiratory distress   GI: Abdomen soft, nondistended, nontender   MSK: Moves all extremities without restriction, atraumatic   Skin: Dry, intact   Psych: Normal thought content and cognition      Significant Labs:  Recent Labs   Lab 02/24/25  1313 02/24/25  1542    93   * 131*   K 3.6 3.5   CL 94* 97   CO2 23 22*   BUN 11 11   CREATININE 0.9 0.8   CALCIUM 10.1 9.4     Recent Labs   Lab 02/24/25  1313 02/24/25  1542   WBC 11.38 11.03   HGB 15.6 13.6*   HCT 45.5 40.5    245     Recent Labs   Lab 02/24/25  1313   INR 1.0     Microbiology Results (last 7 days)       ** No results found for the last 168 hours. **          All pertinent labs from the last 24 hours have been reviewed.    Significant Diagnostics:  CT: CT Head Without Contrast  Result Date: 2/24/2025  Right parietal convexity small acute subdural hemorrhage.  No parenchymal hematoma or large vascular territory infarct. Right parietal scalp mild soft tissue swelling/contusion without displaced skull fracture. Suspected sequela of mild chronic microvascular ischemic change with cerebral volume loss, progressed since 2015.  Left caudate tiny remote appearing lacunar type  infarct, but new from 2015. Right parietal calvarium nonspecific heterogeneous lucent lesion, new from 2015.  If clinical concern for osseous metastatic lesion, further evaluation with bone scan can be obtained as warranted. This report was flagged in Epic as abnormal. This report was flagged in Epic as containing an incidental finding. COMMUNICATION This critical result was discovered/received at 1209 hrs.  The critical information above was relayed directly by me by telephone to Dr. Escamilla in the emergency department on 2/24/25 at 1212 hrs. Electronically signed by: Benny Elena MD Date:    02/24/2025 Time:    12:32    MRI: No results found in the last 24 hours.  I have reviewed all pertinent imaging results/findings within the past 24 hours.  I have reviewed and interpreted all pertinent imaging results/findings within the past 24 hours.    Assessment/Plan:     * SDH (subdural hematoma)  Dell Sewell III is a 59 y.o. male with past medical history of HTN and HLD who presents to [facility] as transfer from [place] for neurosurgical evaluation small right parietal convexity SDH. Pt reports fall approx 2 weeks ago and hit his head on the arm of his couch. Pt has experienced headache, weakness, and intermittent dizziness since that time. Denies numbness, tingling, blurry vision, N/V, saddle anesthesia, bowel/bladder changes, or other focal neurological deficits. Denies AP/ AC use.     Imaging:  CTH 2/24: Right parietal convexity small acute subdural hemorrhage      Plan:  Patient seen by NSGY at bedside  All labs and diagnostics reviewed  Follow up repeat CTH 6h stability scan.  hold anti-plt/coag medications   No acute surgical intervention at this time  If repeat scan is stable, pt ok to discharge home from NSGY perspective  Continue to monitor clinically, notify NSGY immediately with any changes in neuro status      Plan discussed with Dr. Mercer     Dispo: pending imaging        Thank you for your consult.      Cuco West MD  Neurosurgery  [facility] - Emergency Dept       [1] (Not in a hospital admission)

## 2025-02-24 NOTE — ED NOTES
Anamaria with transfer center called and states that pt is accepted to [facility] neuro accepting provider is Dr. Mercer # [phone]

## 2025-02-24 NOTE — ED NOTES
Current medication list   Losartan/ hctz 50/12.5 daily  Gabapentin 300mg 3 x's daily  Meloxicam 15mg daily  Methocarbam 750mg 3 times daily   Albuterol mdi needed

## 2025-02-24 NOTE — ED NOTES
Primary provider, Mirian, made aware of abnormal CT radiology phone call and that MD Escamilla made aware, v/u.

## 2025-02-24 NOTE — ED PROVIDER NOTES
Encounter Date: 2/24/2025       History     Chief Complaint   Patient presents with    Headache     Headache for a few weeks after mechanical fall. Patient complaining of intermittent dizziness, transfer from [facility] for neuro consult.     Patient is a 59-year-old male with past medical history significant for hypertension who presents as transfer for evaluation of subdural hematoma which was noted on outside imaging today.  Patient reports a headache which has persisted for the last 1-2 weeks following a fall with head trauma.  He has complained of dizziness and headache since that time.  He denies any fever, chills, chest pain, shortness of breath, nausea, vomiting, abdominal pain.  No history of similar symptoms.  Patient reports an episode in which he became acutely dizziness and almost fell/lost consciousness which prompted him to present to the outside ED. CT head concerning for right parietal convexity small acute subdural hemorrhage.     The history is provided by the patient.     Review of patient's allergies indicates:  No Known Allergies  Past Medical History:   Diagnosis Date    High cholesterol     Hypertension      Past Surgical History:   Procedure Laterality Date    CHOLECYSTECTOMY       No family history on file.  Social History[1]  Review of Systems    Physical Exam     Initial Vitals [02/24/25 1518]   BP Pulse Resp Temp SpO2   (!) 145/87 76 (!) 21 98.4 °F (36.9 °C) 98 %      MAP       --         Physical Exam    Nursing note and vitals reviewed.  Constitutional: He appears well-developed and well-nourished. No distress.   HENT:   Head: Normocephalic and atraumatic.   Eyes: EOM are normal. Pupils are equal, round, and reactive to light.   Neck: Neck supple.   Normal range of motion.  Cardiovascular:  Normal rate, regular rhythm and intact distal pulses.           Pulmonary/Chest: Breath sounds normal. No respiratory distress. He has no wheezes.   Abdominal: Abdomen is soft. He exhibits no  distension. There is no abdominal tenderness.   Musculoskeletal:         General: No edema. Normal range of motion.      Cervical back: Normal range of motion and neck supple.      Comments: No midline C, T, L-spine tenderness to palpation.  Full range of motion to the upper and lower extremities bilaterally without tenderness to palpation or deformity     Neurological: He is alert and oriented to person, place, and time. He has normal strength. No cranial nerve deficit or sensory deficit.   Skin: Skin is warm and dry.         ED Course   Procedures  Labs Reviewed   CBC W/ AUTO DIFFERENTIAL - Abnormal       Result Value    WBC 11.03      RBC 4.43 (*)     Hemoglobin 13.6 (*)     Hematocrit 40.5      MCV 91      MCH 30.7      MCHC 33.6      RDW 12.7      Platelets 245      MPV 9.3      Immature Granulocytes 0.4      Gran # (ANC) 7.2      Immature Grans (Abs) 0.04      Lymph # 2.7      Mono # 1.0      Eos # 0.0      Baso # 0.04      nRBC 0      Gran % 65.0      Lymph % 24.8      Mono % 9.2      Eosinophil % 0.2      Basophil % 0.4      Differential Method Automated     COMPREHENSIVE METABOLIC PANEL - Abnormal    Sodium 131 (*)     Potassium 3.5      Chloride 97      CO2 22 (*)     Glucose 93      BUN 11      Creatinine 0.8      Calcium 9.4      Total Protein 8.4      Albumin 3.5      Total Bilirubin 0.6      Alkaline Phosphatase 133      AST 37      ALT 17      eGFR >60.0      Anion Gap 12            Imaging Results              CT Head Without Contrast (Edited Result - FINAL)  Result time 02/25/25 01:05:03      Addendum (preliminary) 1 of 1 by Devonte Boland MD (02/25/25 01:05:03)      Upon further review, there remain areas of patchy osseous sclerosis in the skull, mainly involving the diploic space, and similar in overall appearance to the 06/08/2015 head CT. This is favored to represent a benign process.      Electronically signed by: Devonte Boland  Date:    02/25/2025  Time:    01:05                 Final  result by Devonte Boland MD (02/24/25 21:12:41)                   Impression:      Stable CT appearance of the 3 mm thick small extra-axial hyperdensity overlying the posterior convexity of the right cerebral hemisphere, relative to today's earlier scan (but new from 06/08/2015).  This remains suspicious for a small subdural hematoma.    This report was flagged in Epic as abnormal.      Electronically signed by: Devonte Boland  Date:    02/24/2025  Time:    21:12               Narrative:    EXAMINATION:  CT HEAD WITHOUT CONTRAST    CLINICAL HISTORY:  Subdural hemorrhage;    TECHNIQUE:  Low dose axial images were obtained through the head.  Coronal and sagittal reformations were also performed. Contrast was not administered.    COMPARISON:  Noncontrast head CT obtained approximately 8 hours earlier on the same date, 02/24/2025.    Head CT 06/08/2015.    FINDINGS:  There remains an approximately 3 mm thick extra-axial hyperdensity projecting along the posterior parietal convexity of the right cerebral hemisphere.  This remains suspicious for an acute extra-axial hematoma, such as an acute subdural hematoma.    No significant mass effect.    No additional acute intracranial hemorrhage is demonstrated.    No intracranial mass effect, midline shift, or discrete acute large vascular territory brain infarct.    Intracranial arterial calcifications.  Pre-existing low-attenuation changes in the white matter, likely related to chronic small vessel ischemia, with other etiologies not fully excluded.    Diffuse cerebral and cerebral parenchymal volume loss with proportionate degree of presumed ex vacuo prominence of the ventricles, cisterns, fissures, and sulci.    No depressed calvarial fracture.  No lambdoid suture diastasis.  Mastoid air cells remain well aerated bilaterally.  Minimal mucosal disease in portions of the paranasal sinuses.    Orbits demonstrate no acute CT abnormalities.     imaging: No additional  contributory findings.                                       CT Cervical Spine Without Contrast (Final result)  Result time 02/25/25 01:02:53      Final result by Devonte Boland MD (02/25/25 01:02:53)                   Impression:      Allowing for some degenerative changes, there is no acute displaced fracture or traumatic dislocation in the cervical spine.    Electronically signed by resident: Ganga Gonzalez  Date:    02/25/2025  Time:    00:15    Electronically signed by: Devonte Boland  Date:    02/25/2025  Time:    01:02               Narrative:    EXAMINATION:  CT CERVICAL SPINE WITHOUT CONTRAST    CLINICAL HISTORY:  fall;    TECHNIQUE:  Low dose axial images, sagittal and coronal reformations were performed though the cervical spine.  Contrast was not administered.    COMPARISON:  No prior cervical spine imaging.  Limited correlation is made with the head CTs of 02/24/2025 and 06/08/2015.    FINDINGS:  Reversed cervical lordosis.    The vertebral body heights appear well-maintained.  There is no evidence of acute displaced fracture.    The intervertebral disk spaces appear well maintained.  No advanced neural foraminal narrowing or spinal canal stenosis in the cervical spine.    Evaluation of the surrounding soft tissues demonstrates scattered intrathoracic, cervical, and intracranial atherosclerotic calcifications, including some bilateral carotid artery atherosclerotic calcifications.  The airways appear patent.  Lung apices are clear.  Thyroid is unremarkable.  Vascular calcifications at the skull base.    Visualized portions of the skull again demonstrates some areas of patchy osseous sclerosis, mainly involving the diploic space, and similar in overall appearance to the 06/08/2015 head CT.  This is favored to represent a benign process.                                       Medications   acetaminophen tablet 1,000 mg (1,000 mg Oral Given 2/24/25 6817)   prochlorperazine injection Soln 10 mg (10 mg  Intravenous Given 2/24/25 1545)   diphenhydrAMINE injection 12.5 mg (12.5 mg Intravenous Given 2/24/25 1547)   morphine injection 4 mg (4 mg Intravenous Given 2/24/25 1918)   sodium chloride 0.9% bolus 1,000 mL 1,000 mL (0 mLs Intravenous Stopped 2/24/25 6674)     Medical Decision Making  Patient presents for subdural hematoma. Pt well appearing and in no acute distress. Afebrile and hemodynamically stable. EKG without ST elevation. Labs without leukocytosis, clinically significant anemia, or electrolyte abnormality. Case discussed with neurosurgery who recommend follow up CT head. Follow up CT head demonstrates stable small SDH. Per neurosurgery, pt stable to discharge from their perspective in setting of stable repeat head CT. CT cervical spine obtained as well in setting of posterior head/neck trauma. Pt signed out to incoming ED team pending CT cervical spine     Amount and/or Complexity of Data Reviewed  External Data Reviewed: radiology.     Details: === Results for orders placed during the hospital encounter of 07/13/22 ===    Echo    - Interpretation Summary -  · The left ventricle is normal in size with normal systolic function.  · The estimated ejection fraction is 55%.  · Normal left ventricular diastolic function.  · Normal right ventricular size with normal right ventricular systolic function.  · Mild left atrial enlargement.  · The estimated PA systolic pressure is 33 mmHg.  · Normal central venous pressure (3 mmHg).      Labs: ordered.  Radiology: ordered.  ECG/medicine tests: ordered and independent interpretation performed. Decision-making details documented in ED Course.    Risk  OTC drugs.  Prescription drug management.  Parenteral controlled substances.              Attending Attestation:   Physician Attestation Statement for Resident:  As the supervising MD   Physician Attestation Statement: I have personally seen and examined this patient.   I agree with the above history.  -:   As the  supervising MD I agree with the above PE.     As the supervising MD I agree with the above treatment, course, plan, and disposition.   -: See ED course for additional attending MDM                           ED Course as of 02/25/25 0753   Mon Feb 24, 2025   1621 EKG 12-lead  EKG independently interpreted by me shows normal sinus rhythm, rate 79, no STEMI appears similar to prior [BD]      ED Course User Index  [BD] Juice Truong MD                           Clinical Impression:  Final diagnoses:  [R51.9] Headache  [S06.5XAA] Subdural hematoma (Primary)  [R26.89] Balance problem          ED Disposition Condition    Discharge Stable          ED Prescriptions       Medication Sig Dispense Start Date End Date Auth. Provider    acetaminophen (TYLENOL) 500 MG tablet Take 1 tablet (500 mg total) by mouth every 6 (six) hours as needed for Pain. 20 tablet 2/25/2025 -- Rossy Alejandra,           Follow-up Information       Follow up With Specialties Details Why Contact Info    Aman Griggs MD Family Medicine In 1 week  [address] [facility] [place]  [address]  [facility] [id number] [phone]      [facility] NEUROSURGERY Neurosurgery Schedule an appointment as soon as possible for a visit   [address] [facility] [address]  [phone]                 [1]   Social History  Tobacco Use    Smoking status: Every Day     Current packs/day: 0.60     Average packs/day: 0.6 packs/day for 45.2 years (27.1 ttl pk-yrs)     Types: Cigarettes     Start date: 1980    Smokeless tobacco: Never    Tobacco comments:     Pt declines referral to Ambulatory Smoking Cessation clinic.  Handout provided.   Substance Use Topics    Alcohol use: Yes     Comment: 8 quarts beer daily    Drug use: Yes     Types: Cocaine        Chay Chao Jr., MD  Resident  02/25/25 0755

## 2025-02-24 NOTE — ED NOTES
Two unsuccessful IV attempts by this NRP, 1st to right forearm 2nd to left forearm. Both attempts had a flash but blew with advancement   Charge notified and LPN or RN will attempt

## 2025-02-24 NOTE — SUBJECTIVE & OBJECTIVE
Prescriptions Prior to Admission[1]    Review of patient's allergies indicates:  No Known Allergies    Past Medical History:   Diagnosis Date    High cholesterol     Hypertension      Past Surgical History:   Procedure Laterality Date    CHOLECYSTECTOMY       Family History    None       Tobacco Use    Smoking status: Every Day     Current packs/day: 0.60     Average packs/day: 0.6 packs/day for 45.1 years (27.1 ttl pk-yrs)     Types: Cigarettes     Start date: 1980    Smokeless tobacco: Never    Tobacco comments:     Pt declines referral to Ambulatory Smoking Cessation clinic.  Handout provided.   Substance and Sexual Activity    Alcohol use: Yes     Comment: 8 quarts beer daily    Drug use: Yes     Types: Cocaine    Sexual activity: Not on file     Review of Systems  Objective:     Weight: 72.6 kg (160 lb 0.9 oz)  Body mass index is 26.63 kg/m².  Vital Signs (Most Recent):  Temp: 98.3 °F (36.8 °C) (02/24/25 1716)  Pulse: 77 (02/24/25 1716)  Resp: 16 (02/24/25 1716)  BP: (!) 145/87 (02/24/25 1518)  SpO2: 98 % (02/24/25 1716) Vital Signs (24h Range):  Temp:  [97.4 °F (36.3 °C)-98.4 °F (36.9 °C)] 98.3 °F (36.8 °C)  Pulse:  [76-98] 77  Resp:  [16-21] 16  SpO2:  [98 %] 98 %  BP: (126-145)/(72-87) 145/87                        Physical Exam     Neurosurgery Physical Exam  Neuro Exam  General: AOx3, GCS [id number]   CNII-XII: Intact on exam, PERRL, visual fields grossly intact, EOMi, facial sensation preserved, no facial asymmetry, tongue/uvula/palate midline, shoulder shrug equal, no pronator drift   Extremities: grossly full strength throughout, sensorium intact throughout, coordination intact throughout, DTRs 2+, no pathological reflexes, no sensory level present     General: Awake, Alert, Oriented   Head: normocephalic, atraumatic   Eyes: Pupils equal, EOMi   Neck: Supple, normal ROM, no tenderness to palpation   CVS: Normal rate and rhythm, distal pulses present   Pulm: Symmetric expansion, no respiratory distress   GI:  Abdomen soft, nondistended, nontender   MSK: Moves all extremities without restriction, atraumatic   Skin: Dry, intact   Psych: Normal thought content and cognition      Significant Labs:  Recent Labs   Lab 02/24/25  1313 02/24/25  1542    93   * 131*   K 3.6 3.5   CL 94* 97   CO2 23 22*   BUN 11 11   CREATININE 0.9 0.8   CALCIUM 10.1 9.4     Recent Labs   Lab 02/24/25  1313 02/24/25  1542   WBC 11.38 11.03   HGB 15.6 13.6*   HCT 45.5 40.5    245     Recent Labs   Lab 02/24/25  1313   INR 1.0     Microbiology Results (last 7 days)       ** No results found for the last 168 hours. **          All pertinent labs from the last 24 hours have been reviewed.    Significant Diagnostics:  CT: CT Head Without Contrast  Result Date: 2/24/2025  Right parietal convexity small acute subdural hemorrhage.  No parenchymal hematoma or large vascular territory infarct. Right parietal scalp mild soft tissue swelling/contusion without displaced skull fracture. Suspected sequela of mild chronic microvascular ischemic change with cerebral volume loss, progressed since 2015.  Left caudate tiny remote appearing lacunar type infarct, but new from 2015. Right parietal calvarium nonspecific heterogeneous lucent lesion, new from 2015.  If clinical concern for osseous metastatic lesion, further evaluation with bone scan can be obtained as warranted. This report was flagged in Epic as abnormal. This report was flagged in Epic as containing an incidental finding. COMMUNICATION This critical result was discovered/received at 1209 hrs.  The critical information above was relayed directly by me by telephone to Dr. Escamilla in the emergency department on 2/24/25 at 1212 hrs. Electronically signed by: Benny Elena MD Date:    02/24/2025 Time:    12:32    MRI: No results found in the last 24 hours.  I have reviewed all pertinent imaging results/findings within the past 24 hours.  I have reviewed and interpreted all pertinent imaging  results/findings within the past 24 hours.       [1] (Not in a hospital admission)

## 2025-02-24 NOTE — ED NOTES
Pt states that he had a fall over a week ago and has been suffering with intermittent dizziness. Pt states happens at different times and with no known causes. Pt denies blood thinners, denies LOC, denies dizziness at this time. Pt states that last night he had some dizziness while sitting. Pt describes dizziness as the room spinning   Pt also c/o constipations, stating last month being last bowel movement

## 2025-02-24 NOTE — ED PROVIDER NOTES
Encounter Date: 2/24/2025       History     Chief Complaint   Patient presents with    Head Injury     Patient complaining of headache ~few weeks secondary to a mechanical fall. Patient complaining of intermittent dizziness, denies dizziness at this time. Patient also complaining of constipation ~1 month.     The patient is a 59-year-old male who came to the emergency department with ongoing dizziness and mild headache for the past 2 weeks.  He states that he hit his head on the arm rest of the couch in the middle of the night approximately 2 weeks ago.  He denies falling to the ground, he denies any neck pain.  He states since that time, he has sudden onset dizzy spells with headache that lasts a few minutes and then resolves.  He states he has had a mild dull headache for the past 2 days.  No nausea or vomiting, no confusion.  The patient smokes cigarettes and drinks approximately 3-4 beers most days.  He is not on blood thinners and does not take aspirin.      Review of patient's allergies indicates:  No Known Allergies  Past Medical History:   Diagnosis Date    High cholesterol     Hypertension      Past Surgical History:   Procedure Laterality Date    CHOLECYSTECTOMY       No family history on file.  Social History[1]  Review of Systems   All other systems reviewed and are negative.      Physical Exam     Initial Vitals [02/24/25 0943]   BP Pulse Resp Temp SpO2   126/72 98 18 97.4 °F (36.3 °C) 98 %      MAP       --         Physical Exam    Nursing note and vitals reviewed.  Constitutional: He appears well-developed and well-nourished.   HENT:   Head: Normocephalic and atraumatic.   Eyes: EOM are normal. Pupils are equal, round, and reactive to light.   Neck: Neck supple.   Normal range of motion.  Cardiovascular:  Normal rate, regular rhythm, normal heart sounds and intact distal pulses.           Pulmonary/Chest: Breath sounds normal.   Abdominal: Abdomen is soft. Bowel sounds are normal. He exhibits no  distension. There is no abdominal tenderness. There is no rebound and no guarding.   Musculoskeletal:         General: No edema. Normal range of motion.      Cervical back: Normal range of motion and neck supple.     Neurological: He is alert and oriented to person, place, and time. No cranial nerve deficit or sensory deficit.   Skin: Skin is warm and dry.   Psychiatric: He has a normal mood and affect. His behavior is normal. Judgment and thought content normal.         ED Course   Critical Care    Date/Time: 2/24/2025 12:42 PM    Performed by: Laura Escamilla MD  Authorized by: Laura Escamilla MD  Direct patient critical care time: 10 minutes  Additional history critical care time: 10 minutes  Ordering / reviewing critical care time: 10 minutes  Documentation critical care time: 15 minutes  Consulting other physicians critical care time: 10 minutes  Consult with family critical care time: 10 minutes  Total critical care time (exclusive of procedural time) : 65 minutes  Critical care time was exclusive of separately billable procedures and treating other patients.  Critical care was necessary to treat or prevent imminent or life-threatening deterioration of the following conditions: CNS failure or compromise.  Critical care was time spent personally by me on the following activities: development of treatment plan with patient or surrogate, discussions with consultants, evaluation of patient's response to treatment, examination of patient, obtaining history from patient or surrogate, ordering and performing treatments and interventions, ordering and review of laboratory studies, ordering and review of radiographic studies, pulse oximetry, re-evaluation of patient's condition and review of old charts.        Labs Reviewed   CBC W/ AUTO DIFFERENTIAL - Abnormal       Result Value    WBC 11.38      RBC 5.02      Hemoglobin 15.6      Hematocrit 45.5      MCV 91      MCH 31.1 (*)     MCHC 34.3      RDW 12.9      Platelets  252      MPV 8.9 (*)     Immature Granulocytes 0.4      Gran # (ANC) 7.6      Immature Grans (Abs) 0.04      Lymph # 2.7      Mono # 0.9      Eos # 0.0      Baso # 0.05      nRBC 0      Gran % 66.7      Lymph % 24.0      Mono % 8.2      Eosinophil % 0.3      Basophil % 0.4      Differential Method Automated     COMPREHENSIVE METABOLIC PANEL - Abnormal    Sodium 132 (*)     Potassium 3.6      Chloride 94 (*)     CO2 23      Glucose 102      BUN 11      Creatinine 0.9      Calcium 10.1      Total Protein 10.4 (*)     Albumin 4.3      Total Bilirubin 0.7      Alkaline Phosphatase 156 (*)     AST 37      ALT 18      eGFR >60      Anion Gap 15     PROTIME-INR    Prothrombin Time 11.6      INR 1.0       EKG Readings: (Independently Interpreted)   Initial: 1307. Rhythm: Normal Sinus Rhythm. Heart Rate: 79. Ectopy: No Ectopy. Conduction: Normal. ST Segments: Normal ST Segments. T Waves: Normal. Clinical Impression: Normal Sinus Rhythm     ECG Results              EKG 12-lead (In process)        Collection Time Result Time QRS Duration OHS QTC Calculation    02/24/25 13:07:27 02/24/25 14:21:07 88 421                     In process by Interface, Lab In [facility] (02/24/25 14:21:16)                   Narrative:    Test Reason : S09.90XA,    Vent. Rate :  79 BPM     Atrial Rate :  79 BPM     P-R Int : 146 ms          QRS Dur :  88 ms      QT Int : 368 ms       P-R-T Axes :  80  81  60 degrees    QTcB Int : 421 ms    Normal sinus rhythm  Normal ECG  When compared with ECG of 06-Jul-2023 18:04,  No significant change was found    Referred By: AAAREFERRAL SELF           Confirmed By:                                   Imaging Results               CT Head Without Contrast (Final result)  Result time 02/24/25 12:32:58      Final result by Benny Elena MD (02/24/25 12:32:58)                   Impression:      Right parietal convexity small acute subdural hemorrhage.  No parenchymal hematoma or large vascular territory  infarct.    Right parietal scalp mild soft tissue swelling/contusion without displaced skull fracture.    Suspected sequela of mild chronic microvascular ischemic change with cerebral volume loss, progressed since 2015.  Left caudate tiny remote appearing lacunar type infarct, but new from 2015.    Right parietal calvarium nonspecific heterogeneous lucent lesion, new from 2015.  If clinical concern for osseous metastatic lesion, further evaluation with bone scan can be obtained as warranted.    This report was flagged in Epic as abnormal.    This report was flagged in Epic as containing an incidental finding.    COMMUNICATION  This critical result was discovered/received at 1209 hrs.  The critical information above was relayed directly by me by telephone to Dr. Escamilla in the emergency department on 2/24/25 at 1212 hrs.      Electronically signed by: Benny Elena MD  Date:    02/24/2025  Time:    12:32               Narrative:    EXAMINATION:  CT HEAD WITHOUT CONTRAST    CLINICAL HISTORY:  Head trauma, moderate-severe;    TECHNIQUE:  Low dose axial CT images obtained throughout the head without intravenous contrast. Sagittal and coronal reconstructions were performed.    COMPARISON:  Head CT 06/08/2015    FINDINGS:  Intracranial compartment:    Age-related mild generalized cerebral volume loss, slightly progressed since 2015. There is a thin hyperdense extra-axial collection along the right parietal convexity measuring up to 3 mm in thickness consistent with acute subdural hemorrhage and new from prior.    Mild periventricular white matter hypoattenuation likely sequela of chronic microvascular ischemic change.  Acute appearing tiny lacunar type infarct at the left caudate, new from 2015. No parenchymal mass, hemorrhage, edema or major vascular distribution infarct.  Skull base atherosclerotic vascular calcifications noted.    Skull/extracranial contents (limited evaluation): Small focus of localized soft tissue  swelling/contusion overlying the right parietal calvarium.  No fracture.  Approximate 8-9 mm well-circumscribed rounded slightly heterogeneous lucent lesion with ground-glass internal matrix at the right parietal calvarium, new from prior in 2015.  Mastoid air cells and paranasal sinuses are essentially clear.  Imaged portions of the orbits are within normal limits.                                       Medications - No data to display  Medical Decision Making  Differential Diagnosis includes, but is not limited to:  Ischemic stroke, hemorrhagic stroke, subarachnoid hemorrhage/ruptured aneurysm, intracranial lesion/mass, meningitis/encephalitis, epidural hematoma, subdural hematoma, pseudotumor cerebri, venous sinus thrombosis, CO poisoning, hypertensive encephalopathy, MI/ACS, head trauma/contusion, concussion, sinus headache, dehydration, anxiety, medication non-compliance, primary headache (tension/cluster/migraine).     MDM:  The patient is a 59-year-old male who hit his head 2 weeks ago and has been having ongoing issues since that time with dizziness and headaches.  He has a small right parietal subdural hemorrhage on CT scan.  I will consult Neurosurgery.    1300:  Case discussed with DANILO Vigil.  He recommends transferring the patient to [facility] ED to ED for evaluation in the emergency department.    Amount and/or Complexity of Data Reviewed  Labs: ordered. Decision-making details documented in ED Course.  Radiology: ordered. Decision-making details documented in ED Course.  ECG/medicine tests: ordered and independent interpretation performed. Decision-making details documented in ED Course.               ED Course as of 02/24/25 1921 Mon Feb 24, 2025   1233 Radiology called to state the patient has a small right parietal head bleed [ST]   1239 CT Head Without Contrast(!)  Impression:     Right parietal convexity small acute subdural hemorrhage.  No parenchymal hematoma or large vascular  territory infarct.     Right parietal scalp mild soft tissue swelling/contusion without displaced skull fracture.     Suspected sequela of mild chronic microvascular ischemic change with cerebral volume loss, progressed since 2015.  Left caudate tiny remote appearing lacunar type infarct, but new from 2015.     Right parietal calvarium nonspecific heterogeneous lucent lesion, new from 2015.  If clinical concern for osseous metastatic lesion, further evaluation with bone scan can be obtained as warranted.   [ST]      ED Course User Index  [ST] Laura Escamilla MD                           Clinical Impression:  Final diagnoses:  [S09.90XA] Head injury  [S06.5XAA] Subdural hematoma (Primary)          ED Disposition Condition    Transfer to Another Facility Stable                  Laura Escamilla MD  02/24/25 1310         [1]   Social History  Tobacco Use    Smoking status: Every Day     Current packs/day: 0.60     Average packs/day: 0.6 packs/day for 45.1 years (27.1 ttl pk-yrs)     Types: Cigarettes     Start date: 1980    Smokeless tobacco: Never    Tobacco comments:     Pt declines referral to Ambulatory Smoking Cessation clinic.  Handout provided.   Substance Use Topics    Alcohol use: Yes     Comment: 8 quarts beer daily    Drug use: Yes     Types: Cocaine        Laura Escamilla MD  02/24/25 1922

## 2025-02-24 NOTE — ED NOTES
Radiology [facility] called and MD pascual aware within department at this time, request for number and call back, all aware and v/u. MD Pascual made aware of CT head results verbally and verbalizes understanding at this time. [phone]

## 2025-02-25 ENCOUNTER — TELEPHONE (OUTPATIENT)
Dept: NEUROSURGERY | Facility: CLINIC | Age: 60
End: 2025-02-25
Payer: MEDICAID

## 2025-02-25 VITALS
SYSTOLIC BLOOD PRESSURE: 105 MMHG | HEART RATE: 58 BPM | BODY MASS INDEX: 26.67 KG/M2 | HEIGHT: 65 IN | RESPIRATION RATE: 17 BRPM | OXYGEN SATURATION: 97 % | TEMPERATURE: 98 F | WEIGHT: 160.06 LBS | DIASTOLIC BLOOD PRESSURE: 59 MMHG

## 2025-02-25 DIAGNOSIS — I62.00 NONTRAUMATIC SUBDURAL HEMORRHAGE, UNSPECIFIED: Primary | ICD-10-CM

## 2025-02-25 LAB
OHS QRS DURATION: 88 MS
OHS QTC CALCULATION: 421 MS

## 2025-02-25 RX ORDER — ACETAMINOPHEN 500 MG
500 TABLET ORAL EVERY 6 HOURS PRN
Qty: 20 TABLET | Refills: 0 | Status: SHIPPED | OUTPATIENT
Start: 2025-02-25

## 2025-02-25 NOTE — ED NOTES
Pt family member states that the patient is ready to go home and asking for the nurse to remove the IV. Jm SILVA notified.

## 2025-02-25 NOTE — DISCHARGE INSTRUCTIONS
We will send a referral to neurosurgery to follow up in clinic. Follow up with your primary care doctor as well. Return to the ED for new or worsening symptoms     Diagnosis: Subdural Hemorrhage    Please return to the emergency department if you develop any severe headaches, confusion, inability to walk or talk, or numbness weakness tingling in your arms or legs.  Also return if you develop any chest pain or shortness of breath.  As stated above, please follow up with Neurosurgery and your primary care physician.    Tests today showed:   Labs Reviewed   CBC W/ AUTO DIFFERENTIAL - Abnormal       Result Value    WBC 11.03      RBC 4.43 (*)     Hemoglobin 13.6 (*)     Hematocrit 40.5      MCV 91      MCH 30.7      MCHC 33.6      RDW 12.7      Platelets 245      MPV 9.3      Immature Granulocytes 0.4      Gran # (ANC) 7.2      Immature Grans (Abs) 0.04      Lymph # 2.7      Mono # 1.0      Eos # 0.0      Baso # 0.04      nRBC 0      Gran % 65.0      Lymph % 24.8      Mono % 9.2      Eosinophil % 0.2      Basophil % 0.4      Differential Method Automated     COMPREHENSIVE METABOLIC PANEL - Abnormal    Sodium 131 (*)     Potassium 3.5      Chloride 97      CO2 22 (*)     Glucose 93      BUN 11      Creatinine 0.8      Calcium 9.4      Total Protein 8.4      Albumin 3.5      Total Bilirubin 0.6      Alkaline Phosphatase 133      AST 37      ALT 17      eGFR >60.0      Anion Gap 12       CT Head Without Contrast   Final Result   Abnormal      Stable CT appearance of the 3 mm thick small extra-axial hyperdensity overlying the posterior convexity of the right cerebral hemisphere, relative to today's earlier scan (but new from 06/08/2015).  This remains suspicious for a small subdural hematoma.      This report was flagged in Epic as abnormal.         Electronically signed by: Devonte Boland   Date:    02/24/2025   Time:    21:12      CT Cervical Spine Without Contrast   Final Result      Allowing for some degenerative  changes, there is no acute displaced fracture or traumatic dislocation in the cervical spine.      Electronically signed by resident: Ganga Gonzalez   Date:    02/25/2025   Time:    00:15      Electronically signed by: Devonte Boland   Date:    02/25/2025   Time:    01:02          Treatments you had today:   Medications   acetaminophen tablet 1,000 mg (1,000 mg Oral Given 2/24/25 1547)   prochlorperazine injection Soln 10 mg (10 mg Intravenous Given 2/24/25 1545)   diphenhydrAMINE injection 12.5 mg (12.5 mg Intravenous Given 2/24/25 1547)   morphine injection 4 mg (4 mg Intravenous Given 2/24/25 9158)   sodium chloride 0.9% bolus 1,000 mL 1,000 mL (0 mLs Intravenous Stopped 2/24/25 9314)       Follow-Up Plan:  - Follow-up with primary care doctor within 3 - 5 days  - Additional testing and/or evaluation as directed by your primary doctor    Return to the Emergency Department for symptoms including but not limited to: worsening symptoms, shortness of breath or chest pain, vomiting with inability to hold down fluids, fevers greater than 100.4°F, passing out/fainting/unconsciousness, or other concerning symptoms.

## 2025-02-25 NOTE — PROVIDER PROGRESS NOTES - EMERGENCY DEPT.
Encounter Date: 2/24/2025    ED Physician Progress Notes        Patient is a 59-year-old male with past medical history significant for hypertension who presents as transfer for evaluation of subdural hematoma which was noted on outside imaging today. Had a fall 1-2 weeks ago. He was transferred for stability scan and NSGY evaluation. CT C-spine ordered on arrival, patient signed out to oncoming team pending results.    Remained stable in the department. CT without acute fractures. He was safely discharged home with his wife with a walker for stability and strict return precautions.

## 2025-02-25 NOTE — TELEPHONE ENCOUNTER
----- Message from Rohini Washington sent at 2/25/2025  3:05 AM CST -----  Regarding: small SDH follow up  Please arrange for 2 week follow up with [facility] prior with PA for small SDH  
Called and spoke with pt. Informed the f/u appts. Pt was unsure if he could make it to the appts due to transportation. Advised to call medicaid to see if they offer transportation. Appt letters mailed.   
I have personally seen and examined the patient. I have collaborated with and supervised the

## 2025-02-25 NOTE — ED NOTES
Took report from Villa RN and Mandy RN, and assumed care of pt at this time. Pt resting comfortably and independently repositioned in stretcher with bed locked in lowest position for safety. NAD noted at this time. Respirations even and unlabored and visible chest rise noted.  Patient offered bathroom assistance and denies need at this time. Pt instructed to call if assistance is needed. Pt on continuous cardiac, BP, and O2 monitoring. Call light within reach. No needs at this time. Will continue to monitor.

## 2025-03-06 ENCOUNTER — HOSPITAL ENCOUNTER (EMERGENCY)
Facility: HOSPITAL | Age: 60
Discharge: HOME | End: 2025-03-07
Attending: EMERGENCY MEDICINE
Payer: MEDICAID

## 2025-03-06 VITALS
SYSTOLIC BLOOD PRESSURE: 164 MMHG | WEIGHT: 170 LBS | OXYGEN SATURATION: 99 % | BODY MASS INDEX: 22.53 KG/M2 | RESPIRATION RATE: 21 BRPM | HEART RATE: 65 BPM | DIASTOLIC BLOOD PRESSURE: 94 MMHG | TEMPERATURE: 98 F | HEIGHT: 73 IN

## 2025-03-06 DIAGNOSIS — R11.2 NAUSEA AND VOMITING, UNSPECIFIED VOMITING TYPE: ICD-10-CM

## 2025-03-06 DIAGNOSIS — I65.23 CAROTID ATHEROSCLEROSIS, BILATERAL: ICD-10-CM

## 2025-03-06 DIAGNOSIS — Z87.828 H/O TRAUMATIC SUBDURAL HEMATOMA: ICD-10-CM

## 2025-03-06 DIAGNOSIS — F14.90 COCAINE USE: ICD-10-CM

## 2025-03-06 DIAGNOSIS — E86.0 DEHYDRATION: ICD-10-CM

## 2025-03-06 DIAGNOSIS — K52.9 GASTROENTERITIS: Primary | ICD-10-CM

## 2025-03-06 LAB
ALBUMIN SERPL BCP-MCNC: 3.7 G/DL (ref 3.5–5.2)
ALP SERPL-CCNC: 135 U/L (ref 40–150)
ALT SERPL W/O P-5'-P-CCNC: 15 U/L (ref 10–44)
AMPHET+METHAMPHET UR QL: NEGATIVE
ANION GAP SERPL CALC-SCNC: 17 MMOL/L (ref 8–16)
AST SERPL-CCNC: 31 U/L (ref 10–40)
BARBITURATES UR QL SCN>200 NG/ML: NEGATIVE
BASOPHILS # BLD AUTO: 0.06 K/UL (ref 0–0.2)
BASOPHILS NFR BLD: 0.8 % (ref 0–1.9)
BENZODIAZ UR QL SCN>200 NG/ML: NEGATIVE
BILIRUB SERPL-MCNC: 0.3 MG/DL (ref 0.1–1)
BILIRUB UR QL STRIP: NEGATIVE
BUN SERPL-MCNC: 9 MG/DL (ref 6–20)
BZE UR QL SCN: ABNORMAL
CALCIUM SERPL-MCNC: 9.4 MG/DL (ref 8.7–10.5)
CANNABINOIDS UR QL SCN: NEGATIVE
CHLORIDE SERPL-SCNC: 96 MMOL/L (ref 95–110)
CLARITY UR: CLEAR
CO2 SERPL-SCNC: 17 MMOL/L (ref 23–29)
COLOR UR: YELLOW
CREAT SERPL-MCNC: 0.7 MG/DL (ref 0.5–1.4)
CREAT UR-MCNC: 70.7 MG/DL (ref 23–375)
DIFFERENTIAL METHOD BLD: ABNORMAL
EOSINOPHIL # BLD AUTO: 0 K/UL (ref 0–0.5)
EOSINOPHIL NFR BLD: 0.3 % (ref 0–8)
ERYTHROCYTE [DISTWIDTH] IN BLOOD BY AUTOMATED COUNT: 12.8 % (ref 11.5–14.5)
EST. GFR  (NO RACE VARIABLE): >60 ML/MIN/1.73 M^2
ETHANOL SERPL-MCNC: 91 MG/DL
GLUCOSE SERPL-MCNC: 114 MG/DL (ref 70–110)
GLUCOSE UR QL STRIP: NEGATIVE
HCT VFR BLD AUTO: 36.8 % (ref 40–54)
HGB BLD-MCNC: 12.7 G/DL (ref 14–18)
HGB UR QL STRIP: NEGATIVE
IMM GRANULOCYTES # BLD AUTO: 0.04 K/UL (ref 0–0.04)
IMM GRANULOCYTES NFR BLD AUTO: 0.5 % (ref 0–0.5)
KETONES UR QL STRIP: ABNORMAL
LEUKOCYTE ESTERASE UR QL STRIP: NEGATIVE
LIPASE SERPL-CCNC: 8 U/L (ref 4–60)
LYMPHOCYTES # BLD AUTO: 1.4 K/UL (ref 1–4.8)
LYMPHOCYTES NFR BLD: 17.8 % (ref 18–48)
MCH RBC QN AUTO: 31.1 PG (ref 27–31)
MCHC RBC AUTO-ENTMCNC: 34.5 G/DL (ref 32–36)
MCV RBC AUTO: 90 FL (ref 82–98)
METHADONE UR QL SCN>300 NG/ML: NEGATIVE
MONOCYTES # BLD AUTO: 0.4 K/UL (ref 0.3–1)
MONOCYTES NFR BLD: 5.4 % (ref 4–15)
NEUTROPHILS # BLD AUTO: 5.7 K/UL (ref 1.8–7.7)
NEUTROPHILS NFR BLD: 75.2 % (ref 38–73)
NITRITE UR QL STRIP: NEGATIVE
NRBC BLD-RTO: 0 /100 WBC
OPIATES UR QL SCN: NEGATIVE
PCP UR QL SCN>25 NG/ML: NEGATIVE
PH UR STRIP: 7 [PH] (ref 5–8)
PLATELET # BLD AUTO: 279 K/UL (ref 150–450)
PMV BLD AUTO: 8.9 FL (ref 9.2–12.9)
POTASSIUM SERPL-SCNC: 3.3 MMOL/L (ref 3.5–5.1)
PROT SERPL-MCNC: 8.6 G/DL (ref 6–8.4)
PROT UR QL STRIP: NEGATIVE
RBC # BLD AUTO: 4.08 M/UL (ref 4.6–6.2)
SODIUM SERPL-SCNC: 130 MMOL/L (ref 136–145)
SP GR UR STRIP: 1.01 (ref 1–1.03)
TOXICOLOGY INFORMATION: ABNORMAL
URN SPEC COLLECT METH UR: ABNORMAL
UROBILINOGEN UR STRIP-ACNC: NEGATIVE EU/DL
WBC # BLD AUTO: 7.62 K/UL (ref 3.9–12.7)

## 2025-03-06 PROCEDURE — 80053 COMPREHEN METABOLIC PANEL: CPT

## 2025-03-06 PROCEDURE — 85025 COMPLETE CBC W/AUTO DIFF WBC: CPT

## 2025-03-06 PROCEDURE — 96375 TX/PRO/DX INJ NEW DRUG ADDON: CPT

## 2025-03-06 PROCEDURE — 80307 DRUG TEST PRSMV CHEM ANLYZR: CPT

## 2025-03-06 PROCEDURE — 25000003 PHARM REV CODE 250

## 2025-03-06 PROCEDURE — 83690 ASSAY OF LIPASE: CPT

## 2025-03-06 PROCEDURE — 63600175 PHARM REV CODE 636 W HCPCS

## 2025-03-06 PROCEDURE — 99285 EMERGENCY DEPT VISIT HI MDM: CPT | Mod: 25

## 2025-03-06 PROCEDURE — 96374 THER/PROPH/DIAG INJ IV PUSH: CPT

## 2025-03-06 PROCEDURE — 96361 HYDRATE IV INFUSION ADD-ON: CPT

## 2025-03-06 PROCEDURE — 81003 URINALYSIS AUTO W/O SCOPE: CPT | Mod: 59

## 2025-03-06 PROCEDURE — 25500020 PHARM REV CODE 255: Performed by: STUDENT IN AN ORGANIZED HEALTH CARE EDUCATION/TRAINING PROGRAM

## 2025-03-06 PROCEDURE — 82077 ASSAY SPEC XCP UR&BREATH IA: CPT

## 2025-03-06 RX ORDER — DIPHENHYDRAMINE HYDROCHLORIDE 50 MG/ML
25 INJECTION, SOLUTION INTRAMUSCULAR; INTRAVENOUS
Status: COMPLETED | OUTPATIENT
Start: 2025-03-06 | End: 2025-03-06

## 2025-03-06 RX ORDER — ONDANSETRON HYDROCHLORIDE 2 MG/ML
4 INJECTION, SOLUTION INTRAVENOUS
Status: COMPLETED | OUTPATIENT
Start: 2025-03-06 | End: 2025-03-06

## 2025-03-06 RX ORDER — SODIUM CHLORIDE 9 MG/ML
1000 INJECTION, SOLUTION INTRAVENOUS
Status: COMPLETED | OUTPATIENT
Start: 2025-03-06 | End: 2025-03-06

## 2025-03-06 RX ORDER — ONDANSETRON 4 MG/1
4 TABLET, ORALLY DISINTEGRATING ORAL EVERY 6 HOURS PRN
Qty: 28 TABLET | Refills: 0 | Status: SHIPPED | OUTPATIENT
Start: 2025-03-06

## 2025-03-06 RX ORDER — PROCHLORPERAZINE EDISYLATE 5 MG/ML
10 INJECTION INTRAMUSCULAR; INTRAVENOUS ONCE
Status: COMPLETED | OUTPATIENT
Start: 2025-03-06 | End: 2025-03-06

## 2025-03-06 RX ORDER — POTASSIUM CHLORIDE 20 MEQ/1
40 TABLET, EXTENDED RELEASE ORAL
Status: COMPLETED | OUTPATIENT
Start: 2025-03-06 | End: 2025-03-06

## 2025-03-06 RX ADMIN — SODIUM CHLORIDE 1000 ML: 9 INJECTION, SOLUTION INTRAVENOUS at 04:03

## 2025-03-06 RX ADMIN — IOHEXOL 100 ML: 350 INJECTION, SOLUTION INTRAVENOUS at 09:03

## 2025-03-06 RX ADMIN — PROCHLORPERAZINE EDISYLATE 10 MG: 5 INJECTION INTRAMUSCULAR; INTRAVENOUS at 09:03

## 2025-03-06 RX ADMIN — POTASSIUM CHLORIDE 40 MEQ: 1500 TABLET, EXTENDED RELEASE ORAL at 06:03

## 2025-03-06 RX ADMIN — DIPHENHYDRAMINE HYDROCHLORIDE 25 MG: 50 INJECTION INTRAMUSCULAR; INTRAVENOUS at 09:03

## 2025-03-06 RX ADMIN — ONDANSETRON 4 MG: 2 INJECTION INTRAMUSCULAR; INTRAVENOUS at 04:03

## 2025-03-06 NOTE — ED NOTES
Pt presenting to ED BIB [facility] for multiple complaints. Reports he smoked a rock joint today along with three shots of [employer] Vodka. States afterwards he began with cough, N/V/D. Pt AAOx4. GCS 15. VSS. Pt vomiting clear bilious emesis, appears in no visible distress.

## 2025-03-06 NOTE — ED NOTES
Pt unable to void at this time, urinal placed at bedside and instructed to notify staff when he needs to void, verbalized understanding.

## 2025-03-07 LAB
OHS QRS DURATION: 94 MS
OHS QTC CALCULATION: 503 MS

## 2025-03-07 NOTE — ED PROVIDER NOTES
"Encounter Date: 3/6/2025       History     Chief Complaint   Patient presents with    Cough    Diarrhea    Vomiting    Nausea     Pt arrived via [facility] [address] from home with multiple complaints started about 1 hr ago      Patient is a 59-year-old male with a past medical history of high cholesterol and hypertension who presents to emergency room for multiple complaints including cough, body aches, nausea, vomiting, and diarrhea.  Patient states that this started about an hour ago.  He states that he had a "rock joint" earlier this morning with a friend and 3-4 shots of Vodka.  No recent sick contacts.  No abdominal pain, chest pain, or shortness of breath.  No visual changes, weakness, numbness, tingling, or headache at this time.  No medications taken prior to arrival.    The history is provided by the patient. No  was used.     Review of patient's allergies indicates:  No Known Allergies  Past Medical History:   Diagnosis Date    High cholesterol     Hypertension      Past Surgical History:   Procedure Laterality Date    CHOLECYSTECTOMY       No family history on file.  Social History[1]  Review of Systems   Constitutional:  Negative for chills, diaphoresis, fatigue and fever.   HENT:  Negative for congestion, sore throat and trouble swallowing.    Respiratory:  Positive for cough. Negative for shortness of breath.    Cardiovascular:  Negative for chest pain and palpitations.   Gastrointestinal:  Positive for diarrhea, nausea and vomiting. Negative for abdominal pain, blood in stool and constipation.   Genitourinary:  Negative for difficulty urinating, dysuria, frequency and hematuria.   Musculoskeletal:  Positive for myalgias. Negative for back pain.   Skin:  Negative for color change, rash and wound.   Neurological:  Negative for weakness, light-headedness, numbness and headaches.       Physical Exam     Initial Vitals   BP Pulse Resp Temp SpO2   03/06/25 1534 03/06/25 1534 03/06/25 1534 03/06/25 " 1852 03/06/25 1534   120/71 67 20 98.3 °F (36.8 °C) 100 %      MAP       --                Physical Exam    Nursing note and vitals reviewed.  Constitutional: He appears well-developed and well-nourished. He is not diaphoretic. No distress.   Patient lying in bed, actively vomiting.  Awake and alert.  Speaking in complete sentences.  Smells of alcohol.   HENT:   Head: Normocephalic and atraumatic.   Right Ear: External ear normal.   Left Ear: External ear normal.   Eyes: Conjunctivae and EOM are normal.   Neck: Neck supple.   Normal range of motion.  Cardiovascular:  Normal rate and regular rhythm.           Pulmonary/Chest: Breath sounds normal. No respiratory distress. He has no wheezes. He has no rhonchi. He has no rales.   Abdominal: Abdomen is soft. He exhibits no distension. There is no abdominal tenderness. There is no rebound and no guarding.   Musculoskeletal:         General: No tenderness or edema. Normal range of motion.      Cervical back: Normal range of motion and neck supple.     Neurological: He is alert and oriented to person, place, and time. GCS score is 15. GCS eye subscore is 4. GCS verbal subscore is 5. GCS motor subscore is 6.   Skin: Skin is warm. Capillary refill takes less than 2 seconds.   Psychiatric: He has a normal mood and affect. His behavior is normal. Thought content normal.         ED Course   Procedures  Labs Reviewed   CBC W/ AUTO DIFFERENTIAL - Abnormal       Result Value    WBC 7.62      RBC 4.08 (*)     Hemoglobin 12.7 (*)     Hematocrit 36.8 (*)     MCV 90      MCH 31.1 (*)     MCHC 34.5      RDW 12.8      Platelets 279      MPV 8.9 (*)     Immature Granulocytes 0.5      Gran # (ANC) 5.7      Immature Grans (Abs) 0.04      Lymph # 1.4      Mono # 0.4      Eos # 0.0      Baso # 0.06      nRBC 0      Gran % 75.2 (*)     Lymph % 17.8 (*)     Mono % 5.4      Eosinophil % 0.3      Basophil % 0.8      Differential Method Automated     COMPREHENSIVE METABOLIC PANEL - Abnormal     Sodium 130 (*)     Potassium 3.3 (*)     Chloride 96      CO2 17 (*)     Glucose 114 (*)     BUN 9      Creatinine 0.7      Calcium 9.4      Total Protein 8.6 (*)     Albumin 3.7      Total Bilirubin 0.3      Alkaline Phosphatase 135      AST 31      ALT 15      eGFR >60      Anion Gap 17 (*)    URINALYSIS, REFLEX TO URINE CULTURE - Abnormal    Specimen UA Urine, Clean Catch      Color, UA Yellow      Appearance, UA Clear      pH, UA 7.0      Specific Gravity, UA 1.015      Protein, UA Negative      Glucose, UA Negative      Ketones, UA 1+ (*)     Bilirubin (UA) Negative      Occult Blood UA Negative      Nitrite, UA Negative      Urobilinogen, UA Negative      Leukocytes, UA Negative      Narrative:     Specimen Source->Urine   DRUG SCREEN PANEL, URINE EMERGENCY - Abnormal    Benzodiazepines Negative      Methadone metabolites Negative      Cocaine (Metab.) Presumptive Positive (*)     Opiate Scrn, Ur Negative      Barbiturate Screen, Ur Negative      Amphetamine Screen, Ur Negative      THC Negative      Phencyclidine Negative      Creatinine, Urine 70.7      Toxicology Information SEE COMMENT      Narrative:     Specimen Source->Urine   ALCOHOL,MEDICAL (ETHANOL) - Abnormal    Alcohol, Serum 91 (*)    LIPASE    Lipase 8            Imaging Results              CTA Head and Neck (xpd) (Final result)  Result time 03/06/25 22:03:37      Final result by Jamshid Escamilla DO (03/06/25 22:03:37)                   Impression:      1. No acute intracranial abnormality.  No large vessel occlusion or high-grade stenosis.  2. Atherosclerosis of the bilateral carotid bifurcations without significant stenosis.  3. Mild ectasia of the ascending thoracic aorta, partially imaged.      Electronically signed by: Jamshid Escamilla  Date:    03/06/2025  Time:    22:03               Narrative:    EXAMINATION:  CTA HEAD AND NECK (XPD)    CLINICAL HISTORY:  Subdural hemorrhage;    TECHNIQUE:  Non contrast low dose axial images were obtained  though the head. CT angiogram was performed from the level of the gwen to the top of the head following the IV administration of 100mL of Omnipaque 350.   Sagittal and coronal reconstructions and maximum intensity projection reconstructions were performed. Arterial stenosis percentages are based on NASCET measurement criteria. An immediate post-contrast CT head was also performed. RapidAI LVO was utilized to measure arterial blood flow in the brain.    COMPARISON:  Noncontrast CT head from earlier the same date and from 02/24/2025.    FINDINGS:  CT head: The ventricles are normal in size without evidence of hydrocephalus. There are chronic microvascular ischemic changes.  No parenchymal mass, edema or major vascular distribution infarct. There is no intracranial hemorrhage (agree with rapid AI assessment).  No extra-axial blood or fluid collection.    The cranium is intact.  Minimal mucosal thickening of the left anterior ethmoid sinus.  Remaining paranasal sinuses and mastoid air cells are clear.      CTA head:    Anterior circulation: There is atherosclerosis of the bilateral intracranial ICAs.  The bilateral anterior and middle cerebral arteries are within normal limits, without hemodynamically significant stenosis or occlusion.    Posterior circulation: Vertebrobasilar system is within normal limits without focal abnormality.  Persistent fetal origin of the right PCA.  The bilateral posterior cerebral arteries are otherwise within normal limits, without hemodynamically significant stenosis or occlusion.    There is no intracranial aneurysm.    CTA neck:    The aortic arch maintains a normal branching pattern.  There is ectasia of the ascending thoracic aorta, measuring up to approximately 4.2 cm in maximal diameter, partially imaged on this study.    The common and internal carotid arteries are normal in course and caliber.  There is atherosclerosis of the bilateral carotid bifurcations without significant  stenosis in either carotid bifurcation.    The vertebral origins are patent.  The right vertebral artery is dominant.  The cervical vertebral arteries are normal in course and caliber.    The soft tissues of the neck are unremarkable.  There are emphysematous changes of the lung apices.  Nonspecific trace linear material/mucous within the trachea.    There is no acute fracture or subluxation of the cervical spine.                                       CT Head Without Contrast (Final result)  Result time 03/06/25 20:04:18      Final result by Jamshid Escamilla DO (03/06/25 20:04:18)                   Impression:      No acute intracranial abnormality.    Unchanged thin isodense extra-axial collection along the right parietal convexity measuring up to 3 mm in thickness, compatible with a subacute subdural hematoma versus dural thickening.    Chronic microvascular ischemic changes and a tiny remote lacunar infarct in the left caudate head, stable.      Electronically signed by: Jamshid Escamilla  Date:    03/06/2025  Time:    20:04               Narrative:    EXAMINATION:  CT HEAD WITHOUT CONTRAST    CLINICAL HISTORY:  Headache, new or worsening (Age >= 50y);    TECHNIQUE:  Low dose axial CT images obtained throughout the head without intravenous contrast. Sagittal and coronal reconstructions were performed.    COMPARISON:  CT head from 02/24/2025.    FINDINGS:  Ventricles and sulci are normal in size for age without evidence of hydrocephalus. Again seen is a thin right parietal isodense extra-axial fluid collection measuring up to 3 mm in thickness, unchanged in comparison with prior CT dated 02/24/2025.  There is no evidence of significant mass effect or midline shift.  There is a remote lacunar infarct in the left caudate head.  There are chronic microvascular ischemic changes, stable.  No parenchymal mass, hemorrhage, edema or major vascular distribution infarct.    No calvarial fracture.  Again seen is nonspecific  patchy osseous sclerosis of the skull, mainly involving the diploic space, stable from prior and favored to represent a benign process.  The scalp is unremarkable.  Bilateral paranasal sinuses and mastoid air cells are clear.                                       Medications   0.9% NaCl infusion (0 mLs Intravenous Stopped 3/6/25 1716)   ondansetron injection 4 mg (4 mg Intravenous Given 3/6/25 1610)   potassium chloride SA CR tablet 40 mEq (40 mEq Oral Given 3/6/25 1851)   prochlorperazine injection Soln 10 mg (10 mg Intravenous Given 3/6/25 2121)   diphenhydrAMINE injection 25 mg (25 mg Intravenous Given 3/6/25 2121)   iohexoL (OMNIPAQUE 350) injection 100 mL (100 mLs Intravenous Given 3/6/25 2144)     Medical Decision Making  Patient presents to emergency room for nausea, vomiting, and diarrhea.  Vital signs stable.  Physical exam as stated above.    Differential Diagnosis includes, but is not limited to bowel obstruction, incarcerated/strangulated hernia, ileus, appendicitis, cholecystitis, aspirated foreign body, esophageal food impaction, biliary colic, colitis/diverticulitis, gastroenteritis, esophagitis, hepatitis, pancreatitis, GERD, PUD, constipation, nephrolithiasis, or UTI/pyelonephritis.  Patient is still having bowel movements and flatulence.  Unlikely bowel obstruction.  No hernia felt on exam.  No abdominal tenderness on palpation.  No specific right lower quadrant tenderness.  Unlikely appendicitis.  I also do not suspect cholecystitis or diverticulitis at this time.  Urinalysis without evidence of UTI.  Lab work grossly unremarkable.  Mild hyponatremia and hypokalemia.  Likely due to vomiting.  Patient also started to complain of headache while in the emergency room.  CT with stable subdural hematoma, previous C seen a week ago.  He rates it 10 out of 10.  CTA was done without acute abnormality.  Discussed incidental findings with Neurology who did not have any recommendations at this time.  Patient  given Compazine and Benadryl.  Requesting morphine.  Patient stable. Appears comfortable resting in bed. I do not believe presentation warrants narcotic medication at this time.  Nausea and vomiting likely due to drug use this morning.  Advised patient strongly against drug use.  Advised on conservative management headache and home remedies including rest and ice therapy.  Will prescribe Zofran for nausea and vomiting to use upon discharge.    I see no indication of an emergent process beyond that addressed during our encounter. Patient stable for discharge at this time. I have counseled the patient regarding follow up with neurosurgery due to previous subdural hematoma and gave strict return precautions. I have discussed the final diagnosis and gave instructions regarding prescribed medications. Patient verbalized understanding and is agreeable.     Problems Addressed:  Carotid atherosclerosis, bilateral: chronic illness or injury  Cocaine use: acute illness or injury  Dehydration: acute illness or injury  Gastroenteritis: acute illness or injury  H/O traumatic subdural hematoma: acute illness or injury  Nausea and vomiting, unspecified vomiting type: acute illness or injury    Amount and/or Complexity of Data Reviewed  External Data Reviewed: notes.     Details: Patient last seen on 02/24/2025 for subdural hematoma.  He had repeat CT scan at that time which was stable.  Was lost to follow up with neurosurgery.  Labs: ordered. Decision-making details documented in ED Course.  Radiology: ordered. Decision-making details documented in ED Course.  ECG/medicine tests:      Details: Considered ordering EKG, though patient without any chest pain, palpitations, leg swelling, or SOB at this time.     Risk  Prescription drug management.  Risk Details: Comorbidities taken into consideration during the patient's evaluation and treatment include hypertension.    Social determinants of health taken into consideration during  development of our treatment plan include difficulty in obtaining follow-up, obtaining medications, health literacy, access to healthy options for preventative/conservative management, and/or support systems due to, but not limited to, transportation limitations, socioeconomic status, and environmental factors.                ED Course as of 03/07/25 0039   Thu Mar 06, 2025   1657 CBC W/ AUTO DIFFERENTIAL(!)  CBC relatively unremarkable.  No leukocytosis.  Hemoglobin slightly decreased to 12.7.  Platelet count within normal limits. [BJ]   1719 Urinalysis, Reflex to Urine Culture Urine, Clean Catch(!)  Urinalysis without leukocytes or occult blood.  1+ ketones. [BJ]   1740 Alcohol, Serum(!): 91 [BJ]   1743 Lipase: 8  Lipase within normal limits. [BJ]   1743 Comp. Metabolic Panel(!)  CMP with hyponatremia to 130.  Potassium slightly decreased to 3.3.  BUN and creatinine within normal limits.  LFTs within normal limits.  Mild increase in anion gap. [BJ]   1852 Drug screen panel, emergency(!)  Presumptive cocaine. [BJ]   1915 On re-evaluation, patient states that he is feeling much better.  No nausea.  No episodes of vomiting.  Able to tolerate PO.  Will obtain head CT due to recent subdural hematoma. [BJ]   2017 CT Head Without Contrast  Impression:     No acute intracranial abnormality.     Unchanged thin isodense extra-axial collection along the right parietal convexity measuring up to 3 mm in thickness, compatible with a subacute subdural hematoma versus dural thickening.     Chronic microvascular ischemic changes and a tiny remote lacunar infarct in the left caudate head, stable. [BJ]   2056 Presented to me at this time, pt with acute change in ED [DS]   2057 Patient initially up for discharge.  However, told nurse that he has new onset headache that is 10/10.  Per chart review, patient had a subdural hemorrhage on 02/25.  CT imaging here stable. CTA ordered in addition to compazine. [BJ]   2206 CTA Head and Neck  "(xpd)  Impression:     1. No acute intracranial abnormality.  No large vessel occlusion or high-grade stenosis.  2. Atherosclerosis of the bilateral carotid bifurcations without significant stenosis.  3. Mild ectasia of the ascending thoracic aorta, partially imaged. [BJ]   2221 Discussed with neuro regarding patient's fetal origin PCA. They do not recommend starting patient on any dapto at this time.  [BJ]   2222 On reevaluation, patient states that he still has headache. Reports "last time they gave me morphine for that." [BJ]      ED Course User Index  [BJ] Ailyn Ordaz PA-C  [DS] Ahsan Frye MD                           Clinical Impression:  Final diagnoses:  [K52.9] Gastroenteritis (Primary)  [R11.2] Nausea and vomiting, unspecified vomiting type  [F14.90] Cocaine use  [E86.0] Dehydration  [I65.23] Carotid atherosclerosis, bilateral  [Z87.828] H/O traumatic subdural hematoma          ED Disposition Condition    Discharge Stable          ED Prescriptions       Medication Sig Dispense Start Date End Date Auth. Provider    ondansetron (ZOFRAN-ODT) 4 MG TbDL Take 1 tablet (4 mg total) by mouth every 6 (six) hours as needed (Nausea). 28 tablet 3/6/2025 -- Ailyn Ordaz PA-C          Follow-up Information       Follow up With Specialties Details Why Contact Info    Aman Griggs MD Family Medicine   [address] [facility]  [address]  [facility] [address]  [phone]      [facility] Emergency Medicine Go to  If new or worsening symptoms occur [address] [facility] [address] [phone]            This note was partially created using [employer] Voice Recognition software. Typographical and content errors may occur with this process. While efforts are made to detect and correct such errors, in some cases errors will persist. For this reason, wording in this document should be considered in the proper context and not strictly verbatim.          [1]   Social History  Tobacco Use    Smoking " status: Every Day     Current packs/day: 0.60     Average packs/day: 0.6 packs/day for 45.2 years (27.1 ttl pk-yrs)     Types: Cigarettes     Start date: 1980    Smokeless tobacco: Never    Tobacco comments:     Pt declines referral to Ambulatory Smoking Cessation clinic.  Handout provided.   Substance Use Topics    Alcohol use: Yes     Comment: 8 quarts beer daily    Drug use: Yes     Types: Cocaine        Ailyn Ordaz PA-C  03/07/25 0039

## 2025-03-07 NOTE — ED NOTES
Discharge instructions provided. Pt verbalized understanding. Pt states he needs a ride home. Transportation requested.

## 2025-03-07 NOTE — DISCHARGE INSTRUCTIONS
Today you were seen in the emergency room for vomiting.  I believe this is likely due to a stomach bug, or viral illness.  This may take 5-7 days to resolve on its own.  However, please take medications as prescribed for your symptoms.  Zofran will help with nausea/vomiting.    Use Gatorade/Pedialyte/coconut water or rehydration packets to help stay hydrated. Vitamin water and plain water do not contain rehydrating electrolytes. Increase clear liquids (water, gatorade, pedialyte, broths, jello, etc) Hold off on solids for 12-18 hours. Then advance to BRAT diet (banana, rice, applesauce, tea, toast/crackers), then advance further as tolerated. Use Peptobismol to help alleviate your diarrhea symptoms. Avoid immodium.

## 2025-03-12 ENCOUNTER — OFFICE VISIT (OUTPATIENT)
Dept: NEUROSURGERY | Facility: CLINIC | Age: 60
End: 2025-03-12
Payer: MEDICAID

## 2025-03-12 ENCOUNTER — HOSPITAL ENCOUNTER (OUTPATIENT)
Dept: RADIOLOGY | Facility: HOSPITAL | Age: 60
Discharge: HOME | End: 2025-03-12
Payer: MEDICAID

## 2025-03-12 VITALS — HEART RATE: 76 BPM | DIASTOLIC BLOOD PRESSURE: 70 MMHG | SYSTOLIC BLOOD PRESSURE: 113 MMHG

## 2025-03-12 DIAGNOSIS — S06.5XAA SDH (SUBDURAL HEMATOMA): Primary | ICD-10-CM

## 2025-03-12 DIAGNOSIS — I62.00 NONTRAUMATIC SUBDURAL HEMORRHAGE, UNSPECIFIED: ICD-10-CM

## 2025-03-12 PROCEDURE — 70450 CT HEAD/BRAIN W/O DYE: CPT | Mod: TC

## 2025-03-12 PROCEDURE — 3078F DIAST BP <80 MM HG: CPT | Mod: CPTII,,, | Performed by: PHYSICIAN ASSISTANT

## 2025-03-12 PROCEDURE — 70450 CT HEAD/BRAIN W/O DYE: CPT | Mod: 26,,, | Performed by: RADIOLOGY

## 2025-03-12 PROCEDURE — 99212 OFFICE O/P EST SF 10 MIN: CPT | Mod: PBBFAC,25 | Performed by: PHYSICIAN ASSISTANT

## 2025-03-12 PROCEDURE — 99214 OFFICE O/P EST MOD 30 MIN: CPT | Mod: S$PBB,,, | Performed by: PHYSICIAN ASSISTANT

## 2025-03-12 PROCEDURE — 99999 PR PBB SHADOW E&M-EST. PATIENT-LVL II: CPT | Mod: PBBFAC,,, | Performed by: PHYSICIAN ASSISTANT

## 2025-03-12 PROCEDURE — 3074F SYST BP LT 130 MM HG: CPT | Mod: CPTII,,, | Performed by: PHYSICIAN ASSISTANT

## 2025-03-12 NOTE — PROGRESS NOTES
Neurosurgery  Established Patient    SUBJECTIVE:     History of Present Illness:  Dell Sewell III is a 59 y.o. male with h/o HTN and HLD who presents today for hospital follow up for R SDH sustained after mechanical fall. Patient had falled and hit his heada on the arm of a couch. He did have a ED visit recently for dizziness and nausea, given fluids and resolved. Has not had issue since. He reports some insomnia since the fall. Also reports tenderness over right scalp. Denies headaches or confusion.     Review of patient's allergies indicates:  No Known Allergies    Current Medications[1]    Past Medical History:   Diagnosis Date    High cholesterol     Hypertension      Past Surgical History:   Procedure Laterality Date    CHOLECYSTECTOMY       Family History    None       Social History     Socioeconomic History    Marital status: Single   Tobacco Use    Smoking status: Every Day     Current packs/day: 0.60     Average packs/day: 0.6 packs/day for 45.2 years (27.1 ttl pk-yrs)     Types: Cigarettes     Start date: 1980    Smokeless tobacco: Never    Tobacco comments:     Pt declines referral to Ambulatory Smoking Cessation clinic.  Handout provided.   Substance and Sexual Activity    Alcohol use: Yes     Comment: 8 quarts beer daily    Drug use: Yes     Types: Cocaine       Review of Systems    OBJECTIVE:     Vital Signs  Pulse: 76  BP: 113/70  Pain Score: 10-Worst pain ever  There is no height or weight on file to calculate BMI.    Neurosurgery Physical Exam  General: well developed, well nourished, no distress.   Head: normocephalic, atraumatic  Neurologic: Alert and oriented. Thought content appropriate.  GCS: Motor: 6/Verbal: 5/Eyes: 4 GCS Total: 15  Language: No aphasia  Speech: No dysarthria  Cranial nerves: face symmetric, tCN II-XII grossly intact.   Eyes: EOMI.   Pulmonary: normal respirations, no signs of respiratory distress  Skin: Skin is warm, dry and intact.  Sensory: intact to light touch  throughout  Motor Strength:Moves all extremities spontaneously with good tone.  Full strength upper and lower extremities. No abnormal movements seen.       Diagnostic Results:  I have personally reviewed imaging and agree with the findings    CT Head Without Contrast  Narrative: EXAMINATION:  CT HEAD WITHOUT CONTRAST    CLINICAL HISTORY:  Subdural hemorrhage;  Nontraumatic subdural hemorrhage, unspecified    TECHNIQUE:  Multiple sequential 5 mm axial images of the head without contrast.  Coronal and sagittal reformatted imaging from the axial acquisition.    COMPARISON:  CT head 03/06/2025    FINDINGS:  There is no evidence for acute intracranial hemorrhage or sulcal effacement.  Thin extra-axial collection overlying the right parietal convexity again seen overall reduced in density this measures 0.3 cm in thickness image 58 series 602 compatible with evolving subdural hemorrhage.  No evidence for new hemorrhage.  The ventricles are stable in size without hydrocephalus.  There is no midline shift or significant mass effect.  Visualized paranasal sinuses and mastoid air cells are clear.  This report was flagged in Epic as abnormal.  Impression: Continued though reduced density associated with thin subdural hemorrhage overlying the right parietal convexity.    No evidence for new hemorrhage or new abnormal parenchymal attenuation.  Clinical correlation and follow-up advised    Electronically signed by: Gus Leavitt DO  Date:    03/12/2025  Time:    11:56    ASSESSMENT/PLAN:     59 y.o. male with h/o HTN and HLD who presents today for hospital follow up for R SDH sustained after mechanical fall.    -Referral to concussion clinic  -F/u in 1 month with repeat CTH, sooner with any acute issues    Carole Howard PA-C  Neurosurgery   [facility]      Time spent on this encounter: 32 minutes. This includes face-to-face time and non-face to face time preparing to see the patient (eg, review of tests),  obtaining and/or reviewing separately obtained history, documenting clinical information in the electronic or other health record, independently interpreting results and communicating results to the patient/family/caregiver, or care coordinator       Note dictated with voice recognition software, please excuse any grammatical errors.             [1]   Current Outpatient Medications   Medication Sig Dispense Refill    acetaminophen (TYLENOL) 500 MG tablet Take 1 tablet (500 mg total) by mouth every 6 (six) hours as needed for Pain. 20 tablet 0    losartan (COZAAR) 25 MG tablet Take 1 tablet (25 mg total) by mouth once daily. 30 tablet 6    nicotine (NICODERM CQ) 21 mg/24 hr Place 1 patch onto the skin once daily. 14 patch 0    nicotine polacrilex 2 MG Lozg Take 1 lozenge (2 mg total) by mouth as needed. 72 lozenge 0    ondansetron (ZOFRAN-ODT) 4 MG TbDL Take 1 tablet (4 mg total) by mouth every 6 (six) hours as needed (Nausea). 28 tablet 0     No current facility-administered medications for this visit.

## 2025-03-14 ENCOUNTER — TELEPHONE (OUTPATIENT)
Dept: NEUROSURGERY | Facility: CLINIC | Age: 60
End: 2025-03-14
Payer: MEDICAID

## 2025-03-14 NOTE — TELEPHONE ENCOUNTER
----- Message from Stephanie sent at 3/14/2025  2:19 PM CDT -----  Regarding: Approval  Contact: Leiva[employer] [phone]  Leiva calling Angie Jimenez requesting a callback from nurse or provider in regards to approval for CT scan head or brain without contrast valid 03/14/25 through 05/12/25 . Please call back as soon as possible.Authorization number: [id number]

## 2025-04-10 ENCOUNTER — TELEPHONE (OUTPATIENT)
Dept: NEUROSURGERY | Facility: CLINIC | Age: 60
End: 2025-04-10
Payer: MEDICAID

## 2025-04-10 ENCOUNTER — HOSPITAL ENCOUNTER (OUTPATIENT)
Dept: RADIOLOGY | Facility: HOSPITAL | Age: 60
Discharge: HOME | End: 2025-04-10
Attending: PHYSICIAN ASSISTANT
Payer: MEDICAID

## 2025-04-10 NOTE — TELEPHONE ENCOUNTER
LVM informing pt that I have moved up CT to today at 11:30 at [facility] imaging center since he is symptomatic. Advised that one of our APPs will look at it to determine whether he needs to go to the ED or not.

## 2025-04-10 NOTE — TELEPHONE ENCOUNTER
"----- Message from Narda sent at 4/9/2025  3:55 PM CDT -----  Regarding: pt advice  Contact: [phone]  .Name Of Caller: Self Contact Preference?: [phone] What is the nature of the call?: needing to speak to provider in regards to pt still falling down , dizziness and vomiting, needing to know what to do, psl alfred  Additional Notes:"Thank you for all that you do for our patients"  [place]/ cousin  "

## 2025-04-11 ENCOUNTER — HOSPITAL ENCOUNTER (OUTPATIENT)
Dept: RADIOLOGY | Facility: HOSPITAL | Age: 60
Discharge: HOME | End: 2025-04-11
Attending: PHYSICIAN ASSISTANT
Payer: MEDICAID

## 2025-04-11 DIAGNOSIS — I62.00 NONTRAUMATIC SUBDURAL HEMORRHAGE, UNSPECIFIED: ICD-10-CM

## 2025-04-11 PROCEDURE — 70450 CT HEAD/BRAIN W/O DYE: CPT | Mod: 26,,, | Performed by: RADIOLOGY

## 2025-04-11 PROCEDURE — 70450 CT HEAD/BRAIN W/O DYE: CPT | Mod: TC

## 2025-04-16 ENCOUNTER — TELEPHONE (OUTPATIENT)
Dept: NEUROSURGERY | Facility: CLINIC | Age: 60
End: 2025-04-16

## 2025-04-16 ENCOUNTER — RESULTS FOLLOW-UP (OUTPATIENT)
Dept: NEUROSURGERY | Facility: CLINIC | Age: 60
End: 2025-04-16

## 2025-04-16 DIAGNOSIS — S06.5XAA SDH (SUBDURAL HEMATOMA): Primary | ICD-10-CM

## 2025-04-20 ENCOUNTER — E-CONSULT (OUTPATIENT)
Dept: NEUROLOGY | Facility: OTHER | Age: 60
End: 2025-04-20
Payer: COMMERCIAL

## 2025-04-20 DIAGNOSIS — R11.2 NAUSEA AND VOMITING, UNSPECIFIED VOMITING TYPE: Primary | ICD-10-CM

## 2025-04-20 NOTE — CONSULTS
[facility] NEUROLOGY  Response for E-Consult     Patient Name: Dell Sewell III  MRN: [id number]  Primary Care Provider: Aman Griggs MD (Inactive)   Requesting Provider: Ahsan Frye MD  E-Consult to Specialist  Consult performed by: Fortunato Sanchez MD  Consult ordered by: Ahsan Frye MD          Recommendation:  Unable to interpret the questioned from patient's H&P.  Please provide specific question artifact patient's so general neurology clinic with specific questioned.    Total time of Consultation: 5 minute    I did not speak to the requesting provider verbally about this.     *This eConsult is based on the clinical data available to me and is furnished without benefit of a physical examination. The eConsult will need to be interpreted in light of any clinical issues or changes in patient status not available to me at the time of filing this eConsults. Significant changes in patient condition or level of acuity should result in immediate formal consultation and reevaluation. Please alert me if you have further questions.    Thank you for this eConsult referral.     Fortunato Sanchez MD  [facility] NEUROLOGY

## 2025-04-22 ENCOUNTER — HOSPITAL ENCOUNTER (OUTPATIENT)
Dept: RADIOLOGY | Facility: HOSPITAL | Age: 60
Discharge: HOME | End: 2025-04-22
Attending: PHYSICIAN ASSISTANT
Payer: MEDICAID

## 2025-04-22 ENCOUNTER — TELEPHONE (OUTPATIENT)
Dept: NEUROSURGERY | Facility: CLINIC | Age: 60
End: 2025-04-22
Payer: COMMERCIAL

## 2025-04-22 NOTE — TELEPHONE ENCOUNTER
S/w pt to schedule MRI today. Advised that it is important to have it done today so that he can be evaluated tomorrow. Pt v/u and will try to get his ride to take him. I left a VM for his cousin Kimberly notifying the importance of getting the MRI today so that he can be evaluated tomorrow. Informed her that without the MRI, we would have to reschedule the MRI and the appointment. Left callback number.    TWIIII AVF, V3, no stemi

## 2025-04-22 NOTE — TELEPHONE ENCOUNTER
Informed pt that there is not going to be a Rad tech during his scheduled MRI today so it'll have to be rescheduled. Rescheduled MRI to 4/29 and f/u 4/30. LVM for pt's cousin informing her of times, dates and locations. Left callback number.

## 2025-04-26 ENCOUNTER — HOSPITAL ENCOUNTER (OUTPATIENT)
Dept: RADIOLOGY | Facility: HOSPITAL | Age: 60
Discharge: HOME | End: 2025-04-26
Attending: PHYSICIAN ASSISTANT

## 2025-04-26 DIAGNOSIS — S06.5XAA SDH (SUBDURAL HEMATOMA): ICD-10-CM

## 2025-04-26 PROCEDURE — A9585 GADOBUTROL INJECTION: HCPCS | Performed by: PHYSICIAN ASSISTANT

## 2025-04-26 PROCEDURE — 70553 MRI BRAIN STEM W/O & W/DYE: CPT | Mod: TC

## 2025-04-26 PROCEDURE — 25500020 PHARM REV CODE 255: Performed by: PHYSICIAN ASSISTANT

## 2025-04-26 PROCEDURE — 70553 MRI BRAIN STEM W/O & W/DYE: CPT | Mod: 26,,, | Performed by: RADIOLOGY

## 2025-04-26 RX ORDER — GADOBUTROL 604.72 MG/ML
8 INJECTION INTRAVENOUS
Status: COMPLETED | OUTPATIENT
Start: 2025-04-26 | End: 2025-04-26

## 2025-04-26 RX ADMIN — GADOBUTROL 8 ML: 604.72 INJECTION INTRAVENOUS at 02:04

## 2025-04-28 ENCOUNTER — RESULTS FOLLOW-UP (OUTPATIENT)
Dept: NEUROSURGERY | Facility: HOSPITAL | Age: 60
End: 2025-04-28

## 2025-04-28 ENCOUNTER — TELEPHONE (OUTPATIENT)
Dept: NEUROSURGERY | Facility: CLINIC | Age: 60
End: 2025-04-28
Payer: MEDICAID

## 2025-04-28 DIAGNOSIS — M89.9 SKULL LESION: Primary | ICD-10-CM

## 2025-04-28 NOTE — TELEPHONE ENCOUNTER
Per Lee, no medications will be sent in for headaches. Pt is not postop with neurosurgery. Explained the appointment changes to pts cousin. She will take the patient to the ER if headaches worsen or continue prior to appointments. Concussion referral was placed in march but was never scheduled. Will send a msg to concussion clinic for scheduling referral. Vu.     ----- Message from [employer] sent at 4/28/2025 12:43 PM CDT -----  Regarding: Pt Advice  Contact: [phone]  Luiza/Cousin calling to speak with provider regarding pts headaches after head injury. States headaches are worse. Please call [phone]

## 2025-05-05 ENCOUNTER — TELEPHONE (OUTPATIENT)
Dept: NEUROSURGERY | Facility: CLINIC | Age: 60
End: 2025-05-05
Payer: MEDICAID

## 2025-05-05 NOTE — TELEPHONE ENCOUNTER
S/w pt's cousin Luiza. She stated that the pt is currently admitted at [facility] for rectal cancer. Will cancel his appt for 5/6 with Dr. Vazquez for now. Left callback number for her. Advised her to obtain disc of CT CAP from [facility] so that she can bring it to the next appt with Dr. Vazquez. She v/u.

## 2025-06-28 PROBLEM — N30.00 ACUTE CYSTITIS: Status: ACTIVE | Noted: 2025-01-01

## 2025-06-28 PROBLEM — C78.7 METASTATIC COLON CANCER TO LIVER: Status: ACTIVE | Noted: 2025-01-01

## 2025-06-28 PROBLEM — J96.01 ACUTE HYPOXEMIC RESPIRATORY FAILURE: Status: ACTIVE | Noted: 2025-01-01

## 2025-06-28 PROBLEM — C18.9 METASTATIC COLON CANCER TO LIVER: Status: ACTIVE | Noted: 2025-01-01

## 2025-06-28 PROBLEM — Z71.89 ACP (ADVANCE CARE PLANNING): Status: ACTIVE | Noted: 2025-01-01

## 2025-06-28 PROBLEM — Z51.5 COMFORT MEASURES ONLY STATUS: Status: ACTIVE | Noted: 2025-01-01

## 2025-06-28 NOTE — SUBJECTIVE & OBJECTIVE
"Past Medical History:   Diagnosis Date    Colon cancer        No past surgical history on file.    Review of patient's allergies indicates:  Not on File    No current facility-administered medications on file prior to encounter.     No current outpatient medications on file prior to encounter.     Family History    None       Tobacco Use    Smoking status: Not on file    Smokeless tobacco: Not on file   Substance and Sexual Activity    Alcohol use: Not on file    Drug use: Not on file    Sexual activity: Not on file     Review of Systems   Unable to perform ROS: Patient unresponsive     Objective:     Vital Signs (Most Recent):  Pulse: (!) 129 (06/28/25 1413)  Resp: (!) 35 (06/28/25 1415)  BP: (!) 113/57 (06/28/25 1413)  SpO2: (!) 86 % (06/28/25 1224) Vital Signs (24h Range):  Pulse:  [123-153] 129  Resp:  [25-54] 35  SpO2:  [84 %-100 %] 86 %  BP: ()/(33-76) 113/57     Weight: 53 kg (116 lb 13.5 oz)  There is no height or weight on file to calculate BMI.     Physical Exam  Constitutional:       General: He is in acute distress.      Appearance: He is ill-appearing and toxic-appearing.   HENT:      Head: Normocephalic.      Comments: On BiPAP  Pulmonary:      Comments: agonal  Neurological:      Mental Status: He is lethargic.                Significant Labs: A1C: No results for input(s): "HGBA1C" in the last 4320 hours.  Blood Culture: No results for input(s): "LABBLOO" in the last 48 hours.  CBC:   Recent Labs   Lab 06/28/25  0934 06/28/25  1002   WBC  --  10.95   HGB  --  9.7*   HCT 31.0* 30.2*   PLT  --  81*     CMP:   Recent Labs   Lab 06/28/25  1002   *   K 3.9   *   CO2 18*   GLU 92   BUN 35*   CREATININE 1.6*   CALCIUM 7.3*   PROT 5.9*   ALBUMIN 1.3*   BILITOT 2.3*   ALKPHOS 736*   *   ALT 84*   ANIONGAP 13     Lactic Acid: No results for input(s): "LACTATE" in the last 48 hours.  Lipase:   Recent Labs   Lab 06/28/25  1002   LIPASE 27     Lipid Panel: No results for input(s): "CHOL", " ""HDL", "LDLCALC", "TRIG", "CHOLHDL" in the last 48 hours.  Magnesium:   Recent Labs   Lab 06/28/25  1002   MG 2.3     Troponin:   Recent Labs   Lab 06/28/25  1002   TROPONINI 0.222*     TSH: No results for input(s): "TSH" in the last 4320 hours.  Urine Culture: No results for input(s): "LABURIN" in the last 48 hours.  Urine Studies:   Recent Labs   Lab 06/28/25  1100   COLORU Yellow   APPEARANCEUA Cloudy*   PHUR 6.0   SPECGRAV 1.025   PROTEINUA 1+*   GLUCUA Negative   BILIRUBINUA 1+*   OCCULTUA 3+*   NITRITE Negative   UROBILINOGEN >=8.0*   LEUKOCYTESUR 3+*   RBCUA 9*   WBCUA >100*   BACTERIA Many*   HYALINECASTS 0       Significant Imaging: I have reviewed all pertinent imaging results/findings within the past 24 hours.  "

## 2025-06-28 NOTE — HOSPITAL COURSE
Called to see patient with asystole on monitor.  On exam,no spontaneous respiration , no heart sound heart, pupils fixed and no reactive. Patient pronounced on 6/28/2025 @ 1839. Emotional comfort provided to family at bedside.

## 2025-06-28 NOTE — ED NOTES
MD Fallon aware initial blood cultures were not able to be obtained, verbal order to start antibiotics

## 2025-06-28 NOTE — ASSESSMENT & PLAN NOTE
Patient has metastatic cancer of colon primary. The cancer has metastasized t. The patient is not under the care of an outpatient oncologist. The patient is not undergoing active chemotherapy. .Their staging information is listed below.    Cancer Staging   No matching staging information was found for the patient.    S/p chemotherapy and radiation therapy-with disease progression  Was on hospice prior to hospitalization

## 2025-06-28 NOTE — Clinical Note
Diagnosis: Screening for cardiovascular condition [864214]   Future Attending Provider: TONI BURNETT [[id number]]   Reason for IP Medical Treatment  (Clinical interventions that can only be accomplished in the IP setting? ) :: UTI

## 2025-06-28 NOTE — ED PROVIDER NOTES
Encounter Date: 6/28/2025       History   No chief complaint on file.    Patient is a 59-year-old male with a history of stage IV colorectal cancer brought in by EMS from home where he was found by family unresponsive this morning.  CPR was initiated by family member.  Upon EMS arrival patient was found to be hypotensive, tachycardic and with agonal respirations.  He is currently on home hospice care.  Due to severe tachycardia and hypotension patient was given atropine x2 by EMS personnel with only a brief response.  He was also cardioverted.  He presents to the ED unresponsive with a heart rate of 153 and blood pressure of 52/33.      Review of patient's allergies indicates:  Not on File  No past medical history on file.  No past surgical history on file.  No family history on file.  Social History[1]  Review of Systems   Unable to perform ROS: Patient unresponsive       Physical Exam     Initial Vitals   BP Pulse Resp Temp SpO2   06/28/25 0911 06/28/25 0911 06/28/25 0911 -- 06/28/25 0935   (!) 52/33 (!) 153 (!) 25  99 %      MAP       --                Physical Exam    Nursing note and vitals reviewed.  Constitutional:   Unresponsive to both verbal and painful stimuli.   HENT:   Head: Atraumatic.   Eyes:   Pupils 2 mm bilaterally.   Cardiovascular:            Tachycardic.   Pulmonary/Chest:   Agonal respirations.   Abdominal: Abdomen is soft.   Musculoskeletal:         General: No edema.     Neurological:   Unresponsive.   Skin: Skin is warm and dry.         ED Course   Critical Care    Date/Time: 6/28/2025 12:07 PM    Performed by: Allen Lehman MD  Authorized by: Jackeline Atkinson MD  Direct patient critical care time: 60 minutes  Additional history critical care time: 10 minutes  Ordering / reviewing critical care time: 15 minutes  Documentation critical care time: 15 minutes  Consulting other physicians critical care time: 5 minutes  Total critical care time (exclusive of procedural time)  : 105 minutes  Critical care was time spent personally by me on the following activities: examination of patient, discussions with primary provider, obtaining history from patient or surrogate, ordering and performing treatments and interventions, ordering and review of laboratory studies, ordering and review of radiographic studies, pulse oximetry, re-evaluation of patient's condition and review of old charts.        Labs Reviewed   COMPREHENSIVE METABOLIC PANEL - Abnormal       Result Value    Sodium 151 (*)     Potassium 3.9      Chloride 120 (*)     CO2 18 (*)     Glucose 92      BUN 35 (*)     Creatinine 1.6 (*)     Calcium 7.3 (*)     Protein Total 5.9 (*)     Albumin 1.3 (*)     Bilirubin Total 2.3 (*)      (*)      (*)     ALT 84 (*)     Anion Gap 13      eGFR 49 (*)    URINALYSIS, REFLEX TO URINE CULTURE - Abnormal    Color, UA Yellow      Appearance, UA Cloudy (*)     pH, UA 6.0      Spec Grav UA 1.025      Protein, UA 1+ (*)     Glucose, UA Negative      Ketones, UA Negative      Bilirubin, UA 1+ (*)     Blood, UA 3+ (*)     Nitrites, UA Negative      Urobilinogen, UA >=8.0 (*)     Leukocyte Esterase, UA 3+ (*)    TROPONIN I - Abnormal    Troponin-I 0.222 (*)    PROCALCITONIN - Abnormal    Procalcitonin 9.32 (*)    CBC WITH DIFFERENTIAL - Abnormal    WBC 10.95      RBC 3.11 (*)     HGB 9.7 (*)     HCT 30.2 (*)     MCV 97      MCH 31.2 (*)     MCHC 32.1      RDW 20.7 (*)     Platelet Count 81 (*)     MPV 10.3      Nucleated RBC 3 (*)    URINALYSIS MICROSCOPIC - Abnormal    RBC, UA 9 (*)     WBC, UA >100 (*)     Bacteria, UA Many (*)     Yeast, UA None      Squamous Epithelial Cells, UA 0      Hyaline Casts, UA 0      Microscopic Comment       MANUAL DIFFERENTIAL - Abnormal    Gran # (ANC) 9.7      Segmented Neutrophil % 88.0 (*)     Bands % 1.0      Lymphocyte % 9.0 (*)     Myelocyte % 2.0      Platelet Estimate Decreased (*)     Anisocytosis Slight      Hypochromia Occasional     MAGNESIUM -  Normal    Magnesium  2.3     B-TYPE NATRIURETIC PEPTIDE - Normal    BNP 17     LIPASE - Normal    Lipase Level 27     CULTURE, BLOOD   CULTURE, BLOOD   CULTURE, URINE   CBC W/ AUTO DIFFERENTIAL    Narrative:     The following orders were created for panel order CBC auto differential.  Procedure                               Abnormality         Status                     ---------                               -----------         ------                     CBC with Differential[[id number]]       Abnormal            Final result               Manual Differential[[id number]]         Abnormal            Final result                 Please view results for these tests on the individual orders.   LACTIC ACID, PLASMA   EXTRA TUBES    Narrative:     The following orders were created for panel order EXTRA TUBES.  Procedure                               Abnormality         Status                     ---------                               -----------         ------                     Light Blue Top Hold[[id number]]                                                        Bush Top Hold[[id number]]                                                                Please view results for these tests on the individual orders.   LIGHT BLUE TOP HOLD   GREY TOP HOLD          Imaging Results              X-Ray Chest AP Portable (Final result)  Result time 06/28/25 12:04:13      Final result by Nikita Mcmillan MD (06/28/25 12:04:13)                   Impression:      Bilateral lower lung zone reticular opacities, possibly scarring.  No dense focal consolidation.      Electronically signed by: Nikita Mcmillan MD  Date:    06/28/2025  Time:    12:04               Narrative:    EXAMINATION:  XR CHEST AP PORTABLE    CLINICAL HISTORY:  Sepsis;    TECHNIQUE:  Single frontal view of the chest was performed.    COMPARISON:  None    FINDINGS:  Bilateral lower lung zone reticular opacities, possibly scarring. No dense focal consolidation.    No  effusion; however, the left costophrenic angle is excluded from the field of view.    No pneumothorax.    Unremarkable cardiomediastinal silhouette.    Right chest wall port catheter tip projects in the superior cavoatrial junction.    No acute bone abnormality.                                       Medications   vancomycin (VANCOCIN) 1,000 mg in 0.9% NaCl 250 mL IVPB (admixture device) (1,000 mg Intravenous New Bag 6/28/25 1123)   sodium chloride 0.9% flush 10 mL (has no administration in time range)   cefTRIAXone injection 1 g (has no administration in time range)   sodium chloride 0.9% bolus 1,590 mL 1,590 mL (0 mLs Intravenous Stopped 6/28/25 1222)   naloxone 0.4 mg/mL injection 1 mg (1 mg Intravenous Given 6/28/25 1012)   piperacillin-tazobactam (ZOSYN) 4.5 g in D5W 100 mL IVPB (MB+) (0 g Intravenous Stopped 6/28/25 1118)   fentaNYL 50 mcg/mL injection 100 mcg (100 mcg Intravenous Given 6/28/25 1106)   This patient does have evidence of infective focus  My overall impression is sepsis with Acute liver injury and Acute respiratory failure.  Source: Urinary Tract Infection  Antibiotics given-   Antibiotics (72h ago, onward)      Start     Stop Route Frequency Ordered    06/28/25 1800  cefTRIAXone injection 1 g         -- IV Every 24 hours (non-standard times) 06/28/25 1149    06/28/25 1130  vancomycin (VANCOCIN) 1,000 mg in 0.9% NaCl 250 mL IVPB (admixture device)  (ED Adult Sepsis Treatment)         -- IV Once 06/28/25 1035          Latest lactate reviewed-  Recent Labs   Lab 06/28/25  0934   POCLAC 6.3*     Organ dysfunction indicated by Acute respiratory failure    Fluid challenge Actual Body Weight- The patient's actual body weight will be used to calculate the 30 ml/kg fluid bolus.     Post- resuscitation assessment Yes - I attest a sepsis perfusion exam was performed within 6 hours of sepsis, severe sepsis, or septic shock presentation, following fluid resuscitation.      Will Not start Pressors- Levophed  for MAP of 65      Medical Decision Making  Emergent evaluation of a 59-year-old male with a history of stage IV colorectal cancel brought in by EMS from home with an altered mental status.  He presents to the ED with a heart rate of 153 and blood pressure 52/33.  IV fluids have been initiated as well as antibiotics for possible sepsis.  Urinalysis shows obvious signs of infection.  He has remained on BiPAP.  I have discussed with the family that he is in critical condition most likely dying.  They do not wish any heroic measures taken, only comfort care.  I have discussed this with Dr. Dodson, who will admit the patient.    Amount and/or Complexity of Data Reviewed  Labs: ordered.     Details: CBC with a hemoglobin of 9.7 and hematocrit of 30.2.  CMP with a sodium of 153, BUN of 35, creatinine of 1.6.  Alkaline phosphatase a 736, , ALT 84.  Urinalysis is cloudy with many bacteria and >100 WBC.  Radiology: ordered.     Details: Chest x-ray without consolidation.  Discussion of management or test interpretation with external provider(s): I have discussed the patient's condition as well as pertinent vitals and lab values with the [facility] hospitalist.    Risk  Prescription drug management.  Decision regarding hospitalization.               ED Course as of 06/28/25 1236   Sat Jun 28, 2025   0953 I have discussed the gravity of the situation concerning the patient's condition with family members.  They have agreed to no intubation or other heroic measures. [RL]      ED Course User Index  [RL] Allen Lehman MD                           Clinical Impression:  Final diagnoses:  [A41.9] Sepsis, due to unspecified organism, unspecified whether acute organ dysfunction present (Primary)  [E86.0] Dehydration  [Z85.048] History of colorectal cancer          ED Disposition Condition    Admit                     Allen Lehman MD  06/28/25 1234         [1]         Allen Lehman MD  06/28/25  1232

## 2025-06-28 NOTE — ED NOTES
Patient is a hospice patient who receives morphine around the clock for comfort according to family, provided family with comfort and tissue

## 2025-06-28 NOTE — ED NOTES
Patient transported to floor unresponsive with comfort care orders in place, on the transport monitor, on 2L NC

## 2025-06-28 NOTE — HPI
Dell Sewell, is 59-year-old male with past medical history of stage IV colorectal cancer status post radiation therapy and chemotherapy with disease progression and now on hospice.  Brought from home by EMS with complaints of family finding patient unresponsive down at home.  She started CPR and called 911.  EMS reported on arrival patient was hypotensive, tachycardic, with agonal breathing.  Patient received 2 doses of atropine with only brief response and was cardioverted.  In the ED patient was still unresponsive with a heart rate in the 150s and systolic blood pressure in the 50s, heart rate 123, tachypneic, with oxygen saturation in the 70s Per ED doctor family wanted everything done.   Met with patient sister at bedside who was the primary caregiver noted patient has had some decline in the past few days with decreased appetite, decreased decreased activity  Sodium 151, potassium 3.9, chloride 120, platelets 81, H/H 9.7/30.2, troponin 0.222, BNP 17.  Oxygen saturations in the lower 80s on continuous BiPAP.  Patient is started on IV broad-spectrum antibiotics, IV fluid, family noted do not want intubation and want patient comfortable.  Patient was made a DNR.  We will continue BiPAP pending daughter's arrival at the bedside.   Met with family-patient's daughter, brother, sister, mother, and other family members to discuss goals of care.  Family updated on patient's continuous clinical decline and actually transitioning.  Daughter noted just making comfortable.  Comfort measures education provided, emotional comfort provided

## 2025-06-28 NOTE — ASSESSMENT & PLAN NOTE
Advance Care Planning     Date: 06/28/2025    Code Status  In light of the patients advanced and life limiting illness,I engaged the the family in a voluntary conversation about the patient's preferences for care  at the very end of life. The patient wishes to have a natural, peaceful death.  Along those lines, the patient does not wish to have CPR or other invasive treatments performed when his heart and/or breathing stops. I communicated to the family that a DNR order would be placed in his medical record to reflect this preference.    A total of 20 min was spent on advance care planning, goals of care discussion, emotional support, formulating and communicating prognosis and exploring burden/benefit of various approaches of treatment. This discussion occurred on a fully voluntary basis with the verbal consent of the patient and/or family.     [facility]  I engaged the family in a voluntary conversation about advance care planning and we specifically addressed what the goals of care would be moving forward, in light of the patient's change in clinical status, specifically progressive clinical decline due to metastatic colon cancer.  We did specifically address the patient's likely prognosis, which is poor.  We explored the patient's values and preferences for future care.  The family endorses that what is most important right now is to focus on symptom/pain control and comfort and QOL     Accordingly, we have decided that the best plan to meet the patient's goals includes no further escalation in treatment and pivot to comfort-focused care    A total of 45 min was spent on advance care planning, goals of care discussion, emotional support, formulating and communicating prognosis and exploring burden/benefit of various approaches of treatment. This discussion occurred on a fully voluntary basis with the verbal consent of the patient and/or family.

## 2025-06-28 NOTE — H&P
[facility] Medicine  History & Physical    Patient Name: Dell Sewell  MRN: [id number]  Patient Class: IP- Inpatient  Admission Date: 6/28/2025  Attending Physician: Jackeline Atkinson MD  Primary Care Provider: No primary care provider on file.         Patient information was obtained from patient, relative(s), and ER records.     Subjective:     Principal Problem:Metastatic colon cancer to liver    Chief Complaint:   Chief Complaint   Patient presents with    Shortness of Breath        HPI: Dell Sewell, is 59-year-old male with past medical history of stage IV colorectal cancer status post radiation therapy and chemotherapy with disease progression and now on hospice.  Brought from home by EMS with complaints of family finding patient unresponsive down at home.  She started CPR and called 911.  EMS reported on arrival patient was hypotensive, tachycardic, with agonal breathing.  Patient received 2 doses of atropine with only brief response and was cardioverted.  In the ED patient was still unresponsive with a heart rate in the 150s and systolic blood pressure in the 50s, heart rate 123, tachypneic, with oxygen saturation in the 70s Per ED doctor family wanted everything done.   Met with patient sister at bedside who was the primary caregiver noted patient has had some decline in the past few days with decreased appetite, decreased decreased activity  Sodium 151, potassium 3.9, chloride 120, platelets 81, H/H 9.7/30.2, troponin 0.222, BNP 17.  Oxygen saturations in the lower 80s on continuous BiPAP.  Patient is started on IV broad-spectrum antibiotics, IV fluid, family noted do not want intubation and want patient comfortable.  Patient was made a DNR.  We will continue BiPAP pending daughter's arrival at the bedside.   Met with family-patient's daughter, brother, sister, mother, and other family members to discuss goals of care.  Family updated on patient's continuous clinical  "decline and actually transitioning.  Daughter noted just making comfortable.  Comfort measures education provided, emotional comfort provided    Past Medical History:   Diagnosis Date    Colon cancer        No past surgical history on file.    Review of patient's allergies indicates:  Not on File    No current facility-administered medications on file prior to encounter.     No current outpatient medications on file prior to encounter.     Family History    None       Tobacco Use    Smoking status: Not on file    Smokeless tobacco: Not on file   Substance and Sexual Activity    Alcohol use: Not on file    Drug use: Not on file    Sexual activity: Not on file     Review of Systems   Unable to perform ROS: Patient unresponsive     Objective:     Vital Signs (Most Recent):  Pulse: (!) 129 (06/28/25 1413)  Resp: (!) 35 (06/28/25 1415)  BP: (!) 113/57 (06/28/25 1413)  SpO2: (!) 86 % (06/28/25 1224) Vital Signs (24h Range):  Pulse:  [123-153] 129  Resp:  [25-54] 35  SpO2:  [84 %-100 %] 86 %  BP: ()/(33-76) 113/57     Weight: 53 kg (116 lb 13.5 oz)  There is no height or weight on file to calculate BMI.     Physical Exam  Constitutional:       General: He is in acute distress.      Appearance: He is ill-appearing and toxic-appearing.   HENT:      Head: Normocephalic.      Comments: On BiPAP  Pulmonary:      Comments: agonal  Neurological:      Mental Status: He is lethargic.                Significant Labs: A1C: No results for input(s): "HGBA1C" in the last 4320 hours.  Blood Culture: No results for input(s): "LABBLOO" in the last 48 hours.  CBC:   Recent Labs   Lab 06/28/25  0934 06/28/25  1002   WBC  --  10.95   HGB  --  9.7*   HCT 31.0* 30.2*   PLT  --  81*     CMP:   Recent Labs   Lab 06/28/25  1002   *   K 3.9   *   CO2 18*   GLU 92   BUN 35*   CREATININE 1.6*   CALCIUM 7.3*   PROT 5.9*   ALBUMIN 1.3*   BILITOT 2.3*   ALKPHOS 736*   *   ALT 84*   ANIONGAP 13     Lactic Acid: No results for " "input(s): "LACTATE" in the last 48 hours.  Lipase:   Recent Labs   Lab 06/28/25  1002   LIPASE 27     Lipid Panel: No results for input(s): "CHOL", "HDL", "LDLCALC", "TRIG", "CHOLHDL" in the last 48 hours.  Magnesium:   Recent Labs   Lab 06/28/25  1002   MG 2.3     Troponin:   Recent Labs   Lab 06/28/25  1002   TROPONINI 0.222*     TSH: No results for input(s): "TSH" in the last 4320 hours.  Urine Culture: No results for input(s): "LABURIN" in the last 48 hours.  Urine Studies:   Recent Labs   Lab 06/28/25  1100   COLORU Yellow   APPEARANCEUA Cloudy*   PHUR 6.0   SPECGRAV 1.025   PROTEINUA 1+*   GLUCUA Negative   BILIRUBINUA 1+*   OCCULTUA 3+*   NITRITE Negative   UROBILINOGEN >=8.0*   LEUKOCYTESUR 3+*   RBCUA 9*   WBCUA >100*   BACTERIA Many*   HYALINECASTS 0       Significant Imaging: I have reviewed all pertinent imaging results/findings within the past 24 hours.  Assessment/Plan:     Assessment & Plan  Metastatic colon cancer to liver  Patient has metastatic cancer of colon primary. The cancer has metastasized t. The patient is not under the care of an outpatient oncologist. The patient is not undergoing active chemotherapy. .Their staging information is listed below.    Cancer Staging   No matching staging information was found for the patient.    S/p chemotherapy and radiation therapy-with disease progression  Was on hospice prior to hospitalization  Acute hypoxemic respiratory failure  Patient with Hypoxic Respiratory failure which is Acute.  he is not on home oxygen. Supplemental oxygen was provided and noted- Oxygen Concentration (%):  [] 80    Patient with end-stage metastatic cancer with progressive decline  Comfort measures only status  Ativan for agitation   Morphine for dyspnea and pain  Bereavement very      Acute cystitis  UA positive  Started on antibiotics in the ED    VTE Risk Mitigation (From admission, onward)           Ordered     Place sequential compression device  Until discontinued    "      06/28/25 1149     IP VTE LOW RISK PATIENT  Once         06/28/25 1149                                                Jackeline N MD Demetrio  Department of Hospital Medicine  [facility] - Emergency Dept

## 2025-06-28 NOTE — ED NOTES
Pateint present to ED via EMS unresponsive, EMS stated CPR initiated by police on scene, agonal breathing prior to arrival EMS reported he received 6 and 12 mg of adenosine and cardioverted twice. Patient have palpable cartoid pulse, no palapable peripheral pulses. HR 150s, BP 50s/20s, connected to monitors and defib pads. IO established to right lower extremity with NS infusion. 16 fr collins in place. EMS reported he is a hospice patient, history of colon cancer. Patient placed on Bipap by respiratory therapist Noemi Lehman at bedside. No family present to provide additional history

## 2025-06-28 NOTE — CLINICAL REVIEW
IP Sepsis Screen (most recent)       Sepsis Screen (IP) - 06/28/25 1534       Is the patient's history or complaint suggestive of a possible infection? Yes  -LW    Are there at least two of the following signs and symptoms present? Yes  -LW    Sepsis signs/symptoms - Tachycardia Tachycardia     >90  -LW    Sepsis signs/symptoms - Tachypnea Tachypnea     >20  -LW    Sepsis signs/symptoms - Altered Mental Status Altered Mental Status  -LW    Are any of the following organ dysfunction criteria present and not considered to be due to a chronic condition? Yes   chemo -LW    Organ Dysfunction Criteria SBP < 90 or MAP < 65  -LW    Organ Dysfunction Criteria Total Bilirubin > 2.0 Platelet count < 100,000  -LW    Organ Dysfunction Criteria Lactate > 2.0  -LW    Initiate Sepsis Protocol No  -LW    Reason sepsis not considered Pt. receiving appropriate management  -LW              User Key  (r) = Recorded By, (t) = Taken By, (c) = Cosigned By      Initials Name    Radha Amato, RN

## 2025-06-28 NOTE — NURSING
06/28/25 1640   Admission   Initial VN Admission Questions Incomplete   Communication Issues?   (nonresponsive)   Shift   Virtual Nurse - Rounding Complete   Pain Management Interventions care clustered;diversional activity provided;quiet environment facilitated;relaxation techniques promoted   Virtual Nurse - Patient Verbalized Approval Of Camera Use;VN Rounding  (family at bedside)   Type of Frequent Check   Type Patient Rounds;Telemetry Monitoring   Safety/Activity   Patient Rounds bed in low position;ID band on;placement of personal items at bedside;bed wheels locked;call light in patient/parent reach;visualized patient;clutter free environment maintained   Safety Promotion/Fall Prevention assistive device/personal item within reach;bed alarm set;family to remain at bedside;side rails raised x 2   Positioning   Body Position supine   Head of Bed (HOB) Positioning HOB elevated     VIRTUAL NURSE:  Cued into patient's room.  Permission received per family (mother, sister, daughter, and aunt) to turn camera. Patient unresponsive and on comfort care. Introduced as VN for admission that will be working with floor nurse and nursing assistant.  Educated on VN's role in patient care.  Plan of care reviewed included death and dying process and what to expect. Informed that staff will round on patient every 2 hours but to use call light for any other needs they may have.  Call light within reach; bed siderails up x2.  Opportunity given for questions and questions answered.  While completing admission questions daughter jumped and ran to the bedside concerned that patient was no longer breathing and requesting a nurse to the bedside. Secure chat sent to bedside nurse Reg and charge nurse Jennifer. Reg came to bedside to assess respirations and pulse. Admission assessment questions not complete. Family surrounding bedside and privacy provided.

## 2025-06-28 NOTE — NURSING
06/28/25 1700   Financial Resource Strain   How hard is it for you to pay for the very basics like food, housing, medical care, and heating? Pt Declined   Housing Stability   In the last 12 months, was there a time when you were not able to pay the mortgage or rent on time? Pt Declined   At any time in the past 12 months, were you homeless or living in a shelter (including now)? Pt Declined   Transportation Needs   In the past 12 months, has lack of transportation kept you from medical appointments or from getting medications? Pt Declined   In the past 12 months, has lack of transportation kept you from meetings, work, or from getting things needed for daily living? Pt Declined   Food Insecurity   Within the past 12 months, you worried that your food would run out before you got the money to buy more. Pt Declined   Within the past 12 months, the food you bought just didn't last and you didn't have money to get more. Pt Declined   Stress   Do you feel stress - tense, restless, nervous, or anxious, or unable to sleep at night because your mind is troubled all the time - these days? Pt Declined   Utilities   In the past 12 months has the electric, gas, oil, or water company threatened to shut off services in your home? Pt Declined   Health Literacy   How often do you need to have someone help you when you read instructions, pamphlets, or other written material from your doctor or pharmacy? Patient unable to respond     Patient admitted on comfort care; family at bedside.

## 2025-06-28 NOTE — ED NOTES
Family at bedside, provided comfort for family members and tissue, Innocent-Ituah at bedside to speak with family

## 2025-06-28 NOTE — ASSESSMENT & PLAN NOTE
Patient with Hypoxic Respiratory failure which is Acute.  he is not on home oxygen. Supplemental oxygen was provided and noted- Oxygen Concentration (%):  [] 80    Patient with end-stage metastatic cancer with progressive decline

## 2025-06-29 NOTE — DISCHARGE SUMMARY
[facility] Medicine  Discharge Summary      Patient Name: Dell Sewell  MRN: [id number]  DAVID: [id number]  Patient Class: OP- Observation  Admission Date: 6/28/2025  Hospital Length of Stay: 1 days  Discharge Date and Time: 6/28/2025  6:39 PM  Attending Physician: No att. providers found   Discharging Provider: Jackeline Atkinson MD  Primary Care Provider: No primary care provider on file.    Primary Care Team: Networked reference to record PCT     HPI:   Dell Sewell, is 59-year-old male with past medical history of stage IV colorectal cancer status post radiation therapy and chemotherapy with disease progression and now on hospice.  Brought from home by EMS with complaints of family finding patient unresponsive down at home.  She started CPR and called 911.  EMS reported on arrival patient was hypotensive, tachycardic, with agonal breathing.  Patient received 2 doses of atropine with only brief response and was cardioverted.  In the ED patient was still unresponsive with a heart rate in the 150s and systolic blood pressure in the 50s, heart rate 123, tachypneic, with oxygen saturation in the 70s Per ED doctor family wanted everything done.   Met with patient sister at bedside who was the primary caregiver noted patient has had some decline in the past few days with decreased appetite, decreased decreased activity  Sodium 151, potassium 3.9, chloride 120, platelets 81, H/H 9.7/30.2, troponin 0.222, BNP 17.  Oxygen saturations in the lower 80s on continuous BiPAP.  Patient is started on IV broad-spectrum antibiotics, IV fluid, family noted do not want intubation and want patient comfortable.  Patient was made a DNR.  We will continue BiPAP pending daughter's arrival at the bedside.   Met with family-patient's daughter, brother, sister, mother, and other family members to discuss goals of care.  Family updated on patient's continuous clinical decline and actually transitioning.  Daughter noted  just making comfortable.  Comfort measures education provided, emotional comfort provided    * No surgery found *      Hospital Course:   Called to see patient with asystole on monitor.  On exam,no spontaneous respiration , no heart sound heart, pupils fixed and no reactive. Patient pronounced on 6/28/2025 @ 1839. Emotional comfort provided to family at bedside.      Goals of Care Treatment Preferences:  Code Status: DNR          What is most important right now is to focus on symptom/pain control, comfort and QOL .  Accordingly, we have decided that the best plan to meet the patient's goals includes no further escalation in treatment, pivot to comfort-focused care.         Consults:     Assessment & Plan  Metastatic colon cancer to liver  Patient has metastatic cancer of colon primary. The cancer has metastasized t. The patient is not under the care of an outpatient oncologist. The patient is not undergoing active chemotherapy. .Their staging information is listed below.    Cancer Staging   No matching staging information was found for the patient.    S/p chemotherapy and radiation therapy-with disease progression  Was on hospice prior to hospitalization  Acute hypoxemic respiratory failure  Patient with Hypoxic Respiratory failure which is Acute.  he is not on home oxygen. Supplemental oxygen was provided and noted-      Patient with end-stage metastatic cancer with progressive decline  Comfort measures only status  Ativan for agitation   Morphine for dyspnea and pain  Bereavement very      Acute cystitis  UA positive  Started on antibiotics in the ED    ACP (advance care planning)  Advance Care Planning     Date: 06/28/2025    Code Status  In light of the patients advanced and life limiting illness,I engaged the the family in a voluntary conversation about the patient's preferences for care  at the very end of life. The patient wishes to have a natural, peaceful death.  Along those lines, the patient does not  wish to have CPR or other invasive treatments performed when his heart and/or breathing stops. I communicated to the family that a DNR order would be placed in his medical record to reflect this preference.    A total of 20 min was spent on advance care planning, goals of care discussion, emotional support, formulating and communicating prognosis and exploring burden/benefit of various approaches of treatment. This discussion occurred on a fully voluntary basis with the verbal consent of the patient and/or family.     [facility]  I engaged the family in a voluntary conversation about advance care planning and we specifically addressed what the goals of care would be moving forward, in light of the patient's change in clinical status, specifically progressive clinical decline due to metastatic colon cancer.  We did specifically address the patient's likely prognosis, which is poor.  We explored the patient's values and preferences for future care.  The family endorses that what is most important right now is to focus on symptom/pain control and comfort and QOL     Accordingly, we have decided that the best plan to meet the patient's goals includes no further escalation in treatment and pivot to comfort-focused care    A total of 45 min was spent on advance care planning, goals of care discussion, emotional support, formulating and communicating prognosis and exploring burden/benefit of various approaches of treatment. This discussion occurred on a fully voluntary basis with the verbal consent of the patient and/or family.         Final Active Diagnoses:    Diagnosis Date Noted POA    PRINCIPAL PROBLEM:  Metastatic colon cancer to liver [C18.9, C78.7] 06/28/2025 Yes    Acute hypoxemic respiratory failure [J96.01] 06/28/2025 Yes    Comfort measures only status [Z51.5] 06/28/2025 Not Applicable    Acute cystitis [N30.00] 06/28/2025 Yes    ACP (advance care planning) [Z71.89] 06/28/2025 Not Applicable      Problems Resolved  During this Admission:       Discharged Condition:     Disposition:     Follow Up:    Patient Instructions:   No discharge procedures on file.    Significant Diagnostic Studies: N/A    Pending Diagnostic Studies:       Procedure Component Value Units Date/Time    EXTRA TUBES [[id number]]     Order Status: Sent Lab Status: No result     Specimen: Blood, Venous     Narrative:      The following orders were created for panel order EXTRA TUBES.  Procedure                               Abnormality         Status                     ---------                               -----------         ------                     Light Blue Top Hold[[id number]]                                                        Bush Top Hold[[id number]]                                                                Please view results for these tests on the individual orders.    Gray Top Hold [[id number]]     Order Status: Sent Lab Status: No result     Specimen: Blood, Venous     Light Blue Top Hold [[id number]]     Order Status: Sent Lab Status: No result     Specimen: Blood, Venous            Medications:  Reconciled Home Medications:      Medication List      You have not been prescribed any medications.         Indwelling Lines/Drains at time of discharge:   Lines/Drains/Airways       Drain  Duration                  Urethral Catheter Silicone 16 Fr. -- days                        Time spent on the discharge of patient: 35 minutes         Jackeline Atkinson MD  Department of Hospital Medicine  [facility]

## 2025-06-29 NOTE — ASSESSMENT & PLAN NOTE
Patient with Hypoxic Respiratory failure which is Acute.  he is not on home oxygen. Supplemental oxygen was provided and noted-      Patient with end-stage metastatic cancer with progressive decline

## 2025-07-01 LAB
BACTERIA UR CULT: ABNORMAL
BACTERIA UR CULT: ABNORMAL